# Patient Record
Sex: MALE | Race: WHITE | NOT HISPANIC OR LATINO | Employment: OTHER | URBAN - NONMETROPOLITAN AREA
[De-identification: names, ages, dates, MRNs, and addresses within clinical notes are randomized per-mention and may not be internally consistent; named-entity substitution may affect disease eponyms.]

---

## 2017-11-22 ENCOUNTER — OFFICE VISIT (OUTPATIENT)
Dept: URGENT CARE | Facility: CLINIC | Age: 82
End: 2017-11-22
Payer: COMMERCIAL

## 2017-11-22 PROCEDURE — 99213 OFFICE O/P EST LOW 20 MIN: CPT

## 2017-11-22 PROCEDURE — 12001 RPR S/N/AX/GEN/TRNK 2.5CM/<: CPT

## 2017-11-22 PROCEDURE — G0463 HOSPITAL OUTPT CLINIC VISIT: HCPCS

## 2017-11-23 NOTE — PROGRESS NOTES
Assessment    1  Laceration of left index finger without foreign body without damage to nail, initial encounter (883 0) ([id number])    Discussion/Summary  Discussion Summary:   Cleaned daily with soap and water, then apply a thin layer of antibiotic ointment  Keep bandaged to protect from being bumped  Sutures should be removed by your family doctor in 10-12 days  Monitor for any signs of infection, redness, drainage, etc    Understands and agrees with treatment plan: The treatment plan was reviewed with the patient/guardian  The patient/guardian understands and agrees with the treatment plan   Follow Up Instructions: Follow Up with your Primary Care Provider in 10-12 days  If your symptoms worsen, go to the nearest [place] [address] Emergency Department  Chief Complaint    1  Skin Wound  Chief Complaint Free Text Note Form: Laceration of L index finger today  Cut it on a razor knife  Patient states he is up to date on his Tetanus      History of Present Illness  HPI: Left index finger with a 3/4 inch flap like laceration adjacent to the nail  Hospital Based Practices Required Assessment:  Abuse And Domestic Violence Screen   Yes, the patient is safe at home  -- The patient states no one is hurting them  Depression And Suicide Screen  No, the patient has not had thoughts of hurting themself  No, the patient has not felt depressed in the past 7 days  Prefered Language is  [place]  Primary Language is  English  Active Problems  1  Essential hypertension (401 9) (I10)   2  Headache (784 0) (R51)   3  Hypercholesterolemia (272 0) (E78 00)   4  Neck pain (723 1) (M54 2)    Past Medical History  1  History of CAD (coronary artery disease) (414 00) (I25 10)   2  History of congestive heart failure (V12 59) (Z86 79)   3  History of esophageal reflux (V12 79) (Z87 19)   4  History of glaucoma (V12 49) (Z86 69)   5  History of gout (V12 29) (Z87 39)   6  History of hearing loss (V12 49) (Z86 69)   7   History of hyperlipidemia (V12 29) (Z86 39)   8  History of hypertension (V12 59) (Z86 79)   9  History of osteoarthritis (V13 4) (Z87 39)  Active Problems And Past Medical History Reviewed: The active problems and past medical history were reviewed and updated today  Family History  Mother    1  Family history of    2  Family history of hypertension (V17 49) (Z82 49)   3  Family history of myocardial infarction (V17 3) (Z82 49)  Father    4  Family history of    5  Family history of myocardial infarction (V17 3) (Z82 49)  Family History Reviewed: The family history was reviewed and updated today  Social History   · Denied: History of Drug use   · Former smoker (V15 82) (J15 594)   ·    · Nine children   · Retired   · Social drinker  Social History Reviewed: The social history was reviewed and updated today  Surgical History    1  History of Aortocoronary Bypass Graft One Coronary Artery   2  History of Burn Treatment   3  History of Cataract Surgery   4  History of Colonoscopy   5  History of PTCA   6  History of Total Knee Replacement Left  Surgical History Reviewed: The surgical history was reviewed and updated today  Current Meds   1  Albuterol 90 MCG/ACT AERS; Therapy: (Recorded:2016) to Recorded   2  Allopurinol 300 MG Oral Tablet; TAKE 1 TABLET DAILY; Therapy: (Recorded:2016) to Recorded   3  AmLODIPine Besylate 5 MG Oral Tablet; TAKE 1 TABLET DAILY; Therapy: (Recorded:2016) to Recorded   4  Aspirin 325 MG Oral Tablet; TAKE 1 TABLET EVERY MORNING; Therapy: (Recorded:[id number]) to Recorded   5  Azopt 1 % Ophthalmic Suspension; INSTILL 1 DROP INTO AFFECTED EYE(S) 3 TIMES DAILY; Therapy: (Recorded:2016) to Recorded   6  Benazepril HCl - 20 MG Oral Tablet; TAKE 1 TABLET DAILY; Therapy: (Recorded:2016) to Recorded   7  Furosemide 20 MG Oral Tablet; TAKE 1 TABLET Daily except mon/we/fri where pt takes 2 tables;  Therapy: (Recorded:2016) to Recorded   8  Latanoprost 0 005 % Ophthalmic Solution; INSTILL 1 DROP IN BOTH EYES AT BEDTIME; Therapy: (Recorded:30Mar2016) to Recorded   9  Omeprazole 20 MG Oral Capsule Delayed Release; take 1 capsule by mouth once daily; Therapy: (Recorded:30Mar2016) to Recorded   10  Potassium Chloride 10 MEQ TBCR; TAKE 1 TABLET DAILY; Therapy: (Recorded:30Mar2016) to Recorded   11  Pravastatin Sodium 40 MG Oral Tablet; TAKE 1 TABLET AT BEDTIME; Therapy: (Recorded:30Mar2016) to Recorded   12  Voltaren 1 % Transdermal Gel; Therapy: (Recorded:30Mar2016) to Recorded   13  Voltaren 75 MG TBEC; Take 1 tablet twice daily; Therapy: (Recorded:30Mar2016) to Recorded  Medication List Reviewed: The medication list was reviewed and updated today  Allergies    1  No Known Drug Allergies    Vitals  Signs   Recorded: 22Nov2017 10:59AM   Temperature: 97 6 F  Heart Rate: 68  Respiration: 20  Systolic: 689  Diastolic: 67  Height: 5 ft 7 in  Weight: 255 lb   BMI Calculated: 39 94  BSA Calculated: 2 24  O2 Saturation: 97    Physical Exam   Constitutional  General appearance: No acute distress, well appearing and well nourished  Skin  Examination of the skin for lesions: Abnormal  -- Left index finger with a 3/4 inch flap like laceration adjacent to the nail  Procedure   Procedure: wound repair  The wound was located on the and was 2 cm in length left 2 finger(s)  The wound was simple  The wound involved the subcutaneous tissue-- and-- had a skin flap  The wound was clean, but-- did not have a foreign body-- and-- did not have tissue loss  the neurovascular exam was normal  there was no tendon injury  The site was prepped with Betadine  Anesthesia: A digital block was performed  Lidocaine 4 ml, 1%, without epinephrine was injected  Closure: The cutaneous layer was closed with 2 sutures of 4-0 Prolene--   simple interrupted sutures were used for the skin closure    Dressing: a sterile dressing was placed-- and-- an antibiotic ointment was applied        Signatures   Electronically signed by : COLE Marcos ; Nov 22 2017  1:01PM EST                       (Author)

## 2020-03-25 ENCOUNTER — HOSPITAL ENCOUNTER (EMERGENCY)
Facility: HOSPITAL | Age: 85
Discharge: HOME | End: 2020-03-25
Attending: EMERGENCY MEDICINE | Admitting: EMERGENCY MEDICINE
Payer: COMMERCIAL

## 2020-03-25 ENCOUNTER — APPOINTMENT (EMERGENCY)
Dept: CT IMAGING | Facility: HOSPITAL | Age: 85
End: 2020-03-25
Payer: COMMERCIAL

## 2020-03-25 VITALS
HEIGHT: 69 IN | RESPIRATION RATE: 18 BRPM | DIASTOLIC BLOOD PRESSURE: 57 MMHG | HEART RATE: 54 BPM | WEIGHT: 250 LBS | BODY MASS INDEX: 37.03 KG/M2 | TEMPERATURE: 98.2 F | OXYGEN SATURATION: 97 % | SYSTOLIC BLOOD PRESSURE: 114 MMHG
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
## 2020-03-25 DIAGNOSIS — S30.1XXA CONTUSION OF FLANK, INITIAL ENCOUNTER: Primary | ICD-10-CM

## 2020-03-25 DIAGNOSIS — K57.90 DIVERTICULOSIS: ICD-10-CM

## 2020-03-25 DIAGNOSIS — R91.1 PULMONARY NODULE: ICD-10-CM

## 2020-03-25 LAB
ALBUMIN SERPL BCP-MCNC: 4.3 G/DL (ref 3.5–5.7)
ALP SERPL-CCNC: 43 U/L (ref 55–165)
ALT SERPL W P-5'-P-CCNC: 24 U/L (ref 7–52)
ANION GAP SERPL CALCULATED.3IONS-SCNC: 7 MMOL/L (ref 4–13)
APTT PPP: 33 SECONDS (ref 23–37)
AST SERPL W P-5'-P-CCNC: 22 U/L (ref 13–39)
BASOPHILS # BLD AUTO: 0.1 THOUSANDS/ΜL (ref 0–0.1)
BASOPHILS NFR BLD AUTO: 1 % (ref 0–2)
BILIRUB SERPL-MCNC: 0.8 MG/DL (ref 0.2–1)
BILIRUB UR QL STRIP: NEGATIVE
BUN SERPL-MCNC: 18 MG/DL (ref 7–25)
CALCIUM SERPL-MCNC: 9.4 MG/DL (ref 8.6–10.5)
CHLORIDE SERPL-SCNC: 102 MMOL/L (ref 98–107)
CLARITY UR: CLEAR
CO2 SERPL-SCNC: 30 MMOL/L (ref 21–31)
COLOR UR: YELLOW
CREAT SERPL-MCNC: 1.06 MG/DL (ref 0.7–1.3)
EOSINOPHIL # BLD AUTO: 0.4 THOUSAND/ΜL (ref 0–0.61)
EOSINOPHIL NFR BLD AUTO: 5 % (ref 0–5)
ERYTHROCYTE [DISTWIDTH] IN BLOOD BY AUTOMATED COUNT: 13.3 % (ref 11.5–14.5)
GFR SERPL CREATININE-BSD FRML MDRD: 64 ML/MIN/1.73SQ M
GLUCOSE SERPL-MCNC: 113 MG/DL (ref 65–99)
GLUCOSE UR STRIP-MCNC: NEGATIVE MG/DL
HCT VFR BLD AUTO: 49.9 % (ref 42–47)
HGB BLD-MCNC: 16.6 G/DL (ref 14–18)
HGB UR QL STRIP.AUTO: NEGATIVE
INR PPP: 0.99 (ref 0.9–1.5)
KETONES UR STRIP-MCNC: NEGATIVE MG/DL
LEUKOCYTE ESTERASE UR QL STRIP: NEGATIVE
LYMPHOCYTES # BLD AUTO: 2 THOUSANDS/ΜL (ref 0.6–4.47)
LYMPHOCYTES NFR BLD AUTO: 22 % (ref 21–51)
MCH RBC QN AUTO: 33.1 PG (ref 26–34)
MCHC RBC AUTO-ENTMCNC: 33.3 G/DL (ref 31–37)
MCV RBC AUTO: 100 FL (ref 81–99)
MONOCYTES # BLD AUTO: 0.8 THOUSAND/ΜL (ref 0.17–1.22)
MONOCYTES NFR BLD AUTO: 9 % (ref 2–12)
NEUTROPHILS # BLD AUTO: 5.6 THOUSANDS/ΜL (ref 1.4–6.5)
NEUTS SEG NFR BLD AUTO: 63 % (ref 42–75)
NITRITE UR QL STRIP: NEGATIVE
PH UR STRIP.AUTO: 7 [PH]
PLATELET # BLD AUTO: 252 THOUSANDS/UL (ref 149–390)
PMV BLD AUTO: 8.6 FL (ref 8.6–11.7)
POTASSIUM SERPL-SCNC: 4 MMOL/L (ref 3.5–5.5)
PROT SERPL-MCNC: 6.9 G/DL (ref 6.4–8.9)
PROT UR STRIP-MCNC: NEGATIVE MG/DL
PROTHROMBIN TIME: 11.5 SECONDS (ref 10.2–13)
RBC # BLD AUTO: 5.01 MILLION/UL (ref 4.3–5.9)
SODIUM SERPL-SCNC: 139 MMOL/L (ref 134–143)
SP GR UR STRIP.AUTO: 1.01 (ref 1–1.03)
UROBILINOGEN UR QL STRIP.AUTO: 0.2 E.U./DL
WBC # BLD AUTO: 9 THOUSAND/UL (ref 4.8–10.8)

## 2020-03-25 PROCEDURE — 99284 EMERGENCY DEPT VISIT MOD MDM: CPT

## 2020-03-25 PROCEDURE — 80053 COMPREHEN METABOLIC PANEL: CPT | Performed by: EMERGENCY MEDICINE

## 2020-03-25 PROCEDURE — 85025 COMPLETE CBC W/AUTO DIFF WBC: CPT | Performed by: EMERGENCY MEDICINE

## 2020-03-25 PROCEDURE — 36415 COLL VENOUS BLD VENIPUNCTURE: CPT | Performed by: EMERGENCY MEDICINE

## 2020-03-25 PROCEDURE — 85730 THROMBOPLASTIN TIME PARTIAL: CPT | Performed by: EMERGENCY MEDICINE

## 2020-03-25 PROCEDURE — 85610 PROTHROMBIN TIME: CPT | Performed by: EMERGENCY MEDICINE

## 2020-03-25 PROCEDURE — 81003 URINALYSIS AUTO W/O SCOPE: CPT | Performed by: EMERGENCY MEDICINE

## 2020-03-25 PROCEDURE — 96361 HYDRATE IV INFUSION ADD-ON: CPT

## 2020-03-25 PROCEDURE — 74177 CT ABD & PELVIS W/CONTRAST: CPT

## 2020-03-25 PROCEDURE — 99284 EMERGENCY DEPT VISIT MOD MDM: CPT | Performed by: EMERGENCY MEDICINE

## 2020-03-25 PROCEDURE — 96360 HYDRATION IV INFUSION INIT: CPT

## 2020-03-25 PROCEDURE — 71260 CT THORAX DX C+: CPT

## 2020-03-25 RX ORDER — ALLOPURINOL 300 MG/1
300 TABLET ORAL DAILY
COMMUNITY

## 2020-03-25 RX ORDER — BENAZEPRIL HYDROCHLORIDE 10 MG/1
10 TABLET ORAL
COMMUNITY

## 2020-03-25 RX ORDER — LATANOPROST 50 UG/ML
1 SOLUTION/ DROPS OPHTHALMIC
COMMUNITY

## 2020-03-25 RX ORDER — ROSUVASTATIN CALCIUM 20 MG/1
20 TABLET, COATED ORAL DAILY
COMMUNITY

## 2020-03-25 RX ORDER — FUROSEMIDE 20 MG/1
20 TABLET ORAL DAILY
COMMUNITY
End: 2021-04-26 | Stop reason: SDUPTHER

## 2020-03-25 RX ORDER — ISOSORBIDE MONONITRATE 60 MG/1
90 TABLET, EXTENDED RELEASE ORAL DAILY
COMMUNITY

## 2020-03-25 RX ORDER — DIPHENOXYLATE HYDROCHLORIDE AND ATROPINE SULFATE 2.5; .025 MG/1; MG/1
1 TABLET ORAL DAILY
COMMUNITY

## 2020-03-25 RX ORDER — ASPIRIN 325 MG
1 TABLET ORAL DAILY
COMMUNITY
End: 2021-04-26 | Stop reason: DRUGHIGH

## 2020-03-25 RX ADMIN — IOHEXOL 100 ML: 350 INJECTION, SOLUTION INTRAVENOUS at 11:46

## 2020-03-25 RX ADMIN — SODIUM CHLORIDE 500 ML: 0.9 INJECTION, SOLUTION INTRAVENOUS at 10:56

## 2020-03-25 NOTE — ED PROVIDER NOTES
History  Chief Complaint   Patient presents with    Fall     fell on sat, left sided rib pain     Patient is an 80-year-old male  This past Saturday he was up on a ladder, maybe about 6 ft  He was trying to hammer nail into a rafter when he fell  His left flank struck work pants  He did not strike his head  He suffered a significant bruise  He is worried he might have broken a rib  No hemoptysis or trouble breathing  No hematuria  No abdominal pain  He denies any neck pain  No midline back pain  Denies any extremity trauma the injury was sudden  His symptoms have been fairly constant  He does take aspirin  No other anticoagulants  Prior to Admission Medications   Prescriptions Last Dose Informant Patient Reported? Taking?   allopurinol (ZYLOPRIM) 300 mg tablet   Yes No   Sig: Take 300 mg by mouth daily   aspirin 325 mg tablet   Yes No   Sig: Take 1 tablet by mouth daily   benazepril (LOTENSIN) 10 mg tablet   Yes No   Sig: Take 10 mg by mouth   furosemide (LASIX) 20 mg tablet   Yes No   Sig: Take 20 mg by mouth daily   isosorbide mononitrate (IMDUR) 60 mg 24 hr tablet   Yes No   Sig: Take 90 mg by mouth daily   latanoprost (XALATAN) 0 005 % ophthalmic solution   Yes No   Sig: Apply 1 drop to eye   multivitamin (THERAGRAN) TABS   Yes No   Sig: Take 1 tablet by mouth daily   rosuvastatin (CRESTOR) 20 MG tablet   Yes No   Sig: Take 20 mg by mouth daily      Facility-Administered Medications: None       Past Medical History:   Diagnosis Date    Heart disease     Hypertension        History reviewed  No pertinent surgical history  History reviewed  No pertinent family history  I have reviewed and agree with the history as documented      E-Cigarette/Vaping    E-Cigarette Use Never User      E-Cigarette/Vaping Substances     Social History     Tobacco Use    Smoking status: Never Smoker    Smokeless tobacco: Never Used   Substance Use Topics    Alcohol use: Never     Frequency: Never    Drug use: Never       Review of Systems   Constitutional: Negative for chills and fever  HENT: Negative for rhinorrhea and sore throat  Eyes: Negative for pain, redness and visual disturbance  Respiratory: Negative for cough and shortness of breath  Cardiovascular: Negative for chest pain and leg swelling  Gastrointestinal: Negative for abdominal pain, diarrhea and vomiting  Endocrine: Negative for polydipsia and polyuria  Genitourinary: Positive for flank pain  Negative for dysuria, frequency and hematuria  Musculoskeletal: Negative for back pain and neck pain  Skin: Negative for rash and wound  Allergic/Immunologic: Negative for immunocompromised state  Neurological: Negative for weakness, numbness and headaches  Psychiatric/Behavioral: Negative for hallucinations and suicidal ideas  All other systems reviewed and are negative  Physical Exam  Physical Exam   Constitutional: He is oriented to person, place, and time  He appears well-developed and well-nourished  No distress  HENT:   Head: Normocephalic and atraumatic  There is no palpable scalp step off or swelling  No lacerations  There is no facial bone tenderness  No swelling or step-offs  No crepitus  There is no LeFort fracture  No otorrhea  No rhinorrhea  No nasal deformity or epistaxis  There is no raccoon's or Lou's sign  Eyes: Pupils are equal, round, and reactive to light  EOM are normal    Globes are intact  No hyphema  Orbits are atraumatic  Neck:   There is no midline C-spine tenderness  Trachea is midline  There is no step-offs or swelling  No crepitus  No JVD  Cardiovascular: Normal rate, regular rhythm, normal heart sounds and intact distal pulses  Exam reveals no gallop and no friction rub  No murmur heard  Pulmonary/Chest: Effort normal and breath sounds normal  No stridor  No respiratory distress  He exhibits no tenderness  There is no bruising  No crepitus  Abdominal: Soft   Bowel sounds are normal  He exhibits no distension  There is no tenderness  There is no rebound and no guarding  Musculoskeletal: Normal range of motion  He exhibits no tenderness or deformity  No thoracic or lumbar spine tenderness  Pelvis is stable  No palpable step-offs or swelling  No crepitus  There is a large bruise to the left flank  There is left CVA tenderness  There is tenderness to the left posterior lower ribs without crepitus  Extremities are all nontender  Neurological: He is alert and oriented to person, place, and time  He has normal strength  No sensory deficit  GCS eye subscore is 4  GCS verbal subscore is 5  GCS motor subscore is 6  Normal gait  Skin: Skin is warm and dry  He is not diaphoretic  No pallor  Psychiatric: He has a normal mood and affect  His behavior is normal    Vitals reviewed        Vital Signs  ED Triage Vitals [03/25/20 1029]   Temperature Pulse Respirations Blood Pressure SpO2   97 7 °F (36 5 °C) 58 20 146/87 97 %      Temp Source Heart Rate Source Patient Position - Orthostatic VS BP Location FiO2 (%)   Temporal Monitor Sitting Left arm --      Pain Score       9           Vitals:    03/25/20 1029 03/25/20 1234   BP: 146/87 114/57   Pulse: 58 (!) 54   Patient Position - Orthostatic VS: Sitting Lying         Visual Acuity      ED Medications  Medications   sodium chloride 0 9 % bolus 500 mL (0 mL Intravenous Stopped 3/25/20 1232)   iohexol (OMNIPAQUE) 350 MG/ML injection (MULTI-DOSE) 100 mL (100 mL Intravenous Given 3/25/20 1146)       Diagnostic Studies  Results Reviewed     Procedure Component Value Units Date/Time    Protime-INR [[id number]]  (Normal) Collected:  03/25/20 1053    Lab Status:  Final result Specimen:  Blood from Arm, Right Updated:  03/25/20 1128     Protime 11 5 seconds      INR 0 99    APTT [[id number]]  (Normal) Collected:  03/25/20 1053    Lab Status:  Final result Specimen:  Blood from Arm, Right Updated:  03/25/20 1128     PTT 33 seconds     Comprehensive metabolic panel [[id number]]  (Abnormal) Collected:  03/25/20 1054    Lab Status:  Final result Specimen:  Blood from Arm, Right Updated:  03/25/20 1128     Sodium 139 mmol/L      Potassium 4 0 mmol/L      Chloride 102 mmol/L      CO2 30 mmol/L      ANION GAP 7 mmol/L      BUN 18 mg/dL      Creatinine 1 06 mg/dL      Glucose 113 mg/dL      Calcium 9 4 mg/dL      AST 22 U/L      ALT 24 U/L      Alkaline Phosphatase 43 U/L      Total Protein 6 9 g/dL      Albumin 4 3 g/dL      Total Bilirubin 0 80 mg/dL      eGFR 64 ml/min/1 73sq m     Narrative:       [place] guidelines for Chronic Kidney Disease (CKD):     Stage 1 with normal or high GFR (GFR > 90 mL/min/1 73 square meters)    Stage 2 Mild CKD (GFR = 60-89 mL/min/1 73 square meters)    Stage 3A Moderate CKD (GFR = 45-59 mL/min/1 73 square meters)    Stage 3B Moderate CKD (GFR = 30-44 mL/min/1 73 square meters)    Stage 4 Severe CKD (GFR = 15-29 mL/min/1 73 square meters)    Stage 5 End Stage CKD (GFR <15 mL/min/1 73 square meters)  Note: GFR calculation is accurate only with a steady state creatinine    CBC and differential [[id number]]  (Abnormal) Collected:  03/25/20 1053    Lab Status:  Final result Specimen:  Blood from Arm, Right Updated:  03/25/20 1112     WBC 9 00 Thousand/uL      RBC 5 01 Million/uL      Hemoglobin 16 6 g/dL      Hematocrit 49 9 %       fL      MCH 33 1 pg      MCHC 33 3 g/dL      RDW 13 3 %      MPV 8 6 fL      Platelets 033 Thousands/uL      Neutrophils Relative 63 %      Lymphocytes Relative 22 %      Monocytes Relative 9 %      Eosinophils Relative 5 %      Basophils Relative 1 %      Neutrophils Absolute 5 60 Thousands/µL      Lymphocytes Absolute 2 00 Thousands/µL      Monocytes Absolute 0 80 Thousand/µL      Eosinophils Absolute 0 40 Thousand/µL      Basophils Absolute 0 10 Thousands/µL     UA (URINE) with reflex to Scope [[id number]]  (Normal) Collected:  03/25/20 1049    Lab Status:  Final result Specimen:  Urine, Clean Catch Updated:  03/25/20 1102     Color, UA Yellow     Clarity, UA Clear     Specific Gravity, UA 1 010     pH, UA 7 0     Leukocytes, UA Negative     Nitrite, UA Negative     Protein, UA Negative mg/dl      Glucose, UA Negative mg/dl      Ketones, UA Negative mg/dl      Urobilinogen, UA 0 2 E U /dl      Bilirubin, UA Negative     Blood, UA Negative                 CT chest abdomen pelvis w contrast   Final Result by Payton Mohamud MD (03/25 1216)      CT chest:      No acute thoracic process  No acute fracture  3 mm noncalcified nodule left lung base with unknown long-term stability  Based on current Fleischner Society 2017 Guidelines on incidental pulmonary nodule, no routine follow-up is needed if the patient is considered low risk for lung cancer  If the    patient is considered high risk for lung cancer, 12 month follow-up non-contrast chest CT is recommended  CT abdomen and pelvis:      No acute abdominopelvic process  No acute fracture  Colonic diverticulosis  Workstation performed: [id number]                    Procedures  Procedures         ED Course                                 MDM  Number of Diagnoses or Management Options  Diagnosis management comments: Imaging negative for acute traumatic injuries  An incidental pulmonary nodule was found  Patient is a former smoker  He will need a repeat CT scan in 1 year to assess stability  Patient was made aware of this  Recommended follow up with his family doctor for this  Incidental diverticula were also seen  Laboratory evaluation was nonspecific  Appropriate for discharge and outpatient management         Amount and/or Complexity of Data Reviewed  Clinical lab tests: ordered and reviewed  Tests in the radiology section of CPT®: ordered and reviewed          Disposition  Final diagnoses:   Contusion of flank, initial encounter - Left   Fall, initial encounter   Pulmonary nodule Diverticulosis     Time reflects when diagnosis was documented in both MDM as applicable and the Disposition within this note     Time User Action Codes Description Comment    3/25/2020 12:51 PM Huel Sadler Add [S30 1XXA] Contusion of flank, initial encounter     3/25/2020 12:52 PM Huel Sadler Modify [S30 1XXA] Contusion of flank, initial encounter Left    3/25/2020 12:52 PM Huel Sadler Add [U03  VRSL] Fall, initial encounter     3/25/2020 12:52 PM Huel Sadler Add [R91 1] Pulmonary nodule     3/25/2020 12:52 PM Huel Sadler Add [B48 97] Diverticulosis       ED Disposition     ED Disposition Condition Date/Time Comment    Discharge Stable Wed Mar 25, 2020 12:50 PM Lennox Colon discharge to home/self care  Follow-up Information     Follow up With Specialties Details [address], DO Internal Medicine In 1 week As needed Irving Echols  [phone]            Patient's Medications   Discharge Prescriptions    No medications on file     No discharge procedures on file      PDMP Review     None          ED Provider  Electronically Signed by           Janell Mckee MD  03/25/20 9282

## 2021-03-26 ENCOUNTER — APPOINTMENT (EMERGENCY)
Dept: RADIOLOGY | Facility: HOSPITAL | Age: 86
End: 2021-03-26
Payer: COMMERCIAL

## 2021-03-26 ENCOUNTER — APPOINTMENT (INPATIENT)
Dept: RADIOLOGY | Facility: HOSPITAL | Age: 86
DRG: 242 | End: 2021-03-26
Payer: COMMERCIAL

## 2021-03-26 ENCOUNTER — HOSPITAL ENCOUNTER (EMERGENCY)
Facility: HOSPITAL | Age: 86
End: 2021-03-26
Attending: FAMILY MEDICINE
Payer: COMMERCIAL

## 2021-03-26 ENCOUNTER — APPOINTMENT (INPATIENT)
Dept: NON INVASIVE DIAGNOSTICS | Facility: HOSPITAL | Age: 86
DRG: 242 | End: 2021-03-26
Payer: COMMERCIAL

## 2021-03-26 ENCOUNTER — HOSPITAL ENCOUNTER (INPATIENT)
Facility: HOSPITAL | Age: 86
LOS: 4 days | Discharge: HOME | DRG: 242 | End: 2021-03-30
Attending: ANESTHESIOLOGY | Admitting: ANESTHESIOLOGY
Payer: COMMERCIAL

## 2021-03-26 VITALS
DIASTOLIC BLOOD PRESSURE: 62 MMHG | RESPIRATION RATE: 18 BRPM | SYSTOLIC BLOOD PRESSURE: 118 MMHG | WEIGHT: 279.98 LBS | TEMPERATURE: 98.6 F | BODY MASS INDEX: 41.35 KG/M2 | HEART RATE: 36 BPM | OXYGEN SATURATION: 97 %

## 2021-03-26 DIAGNOSIS — R00.1 SYMPTOMATIC BRADYCARDIA: ICD-10-CM

## 2021-03-26 DIAGNOSIS — N17.9 ACUTE RENAL FAILURE (ARF) (HCC): ICD-10-CM

## 2021-03-26 DIAGNOSIS — I44.2 COMPLETE HEART BLOCK (HCC): Primary | ICD-10-CM

## 2021-03-26 DIAGNOSIS — R77.8 ELEVATED TROPONIN: ICD-10-CM

## 2021-03-26 PROBLEM — R73.9 HYPERGLYCEMIA: Status: ACTIVE | Noted: 2021-03-26

## 2021-03-26 PROBLEM — R68.89 EXERCISE INTOLERANCE: Status: ACTIVE | Noted: 2021-03-26

## 2021-03-26 PROBLEM — E78.5 HYPERLIPIDEMIA: Status: ACTIVE | Noted: 2021-03-26

## 2021-03-26 PROBLEM — I10 HYPERTENSION: Status: ACTIVE | Noted: 2021-03-26

## 2021-03-26 PROBLEM — D72.829 LEUKOCYTOSIS: Status: ACTIVE | Noted: 2021-03-26

## 2021-03-26 PROBLEM — M10.9 GOUT: Status: ACTIVE | Noted: 2021-03-26

## 2021-03-26 LAB
ANION GAP SERPL CALCULATED.3IONS-SCNC: 10 MMOL/L (ref 4–13)
ANION GAP SERPL CALCULATED.3IONS-SCNC: 8 MMOL/L (ref 4–13)
ATRIAL RATE: 27 BPM
BASOPHILS # BLD AUTO: 0.04 THOUSANDS/ΜL (ref 0–0.1)
BASOPHILS # BLD AUTO: 0.1 THOUSANDS/ΜL (ref 0–0.1)
BASOPHILS NFR BLD AUTO: 0 % (ref 0–1)
BASOPHILS NFR BLD AUTO: 1 % (ref 0–2)
BNP SERPL-MCNC: 686 PG/ML (ref 1–100)
BUN SERPL-MCNC: 45 MG/DL (ref 7–25)
BUN SERPL-MCNC: 47 MG/DL (ref 5–25)
CA-I BLD-SCNC: 1.15 MMOL/L (ref 1.12–1.32)
CALCIUM SERPL-MCNC: 9 MG/DL (ref 8.3–10.1)
CALCIUM SERPL-MCNC: 9.2 MG/DL (ref 8.6–10.5)
CHLORIDE SERPL-SCNC: 102 MMOL/L (ref 98–107)
CHLORIDE SERPL-SCNC: 107 MMOL/L (ref 100–108)
CO2 SERPL-SCNC: 23 MMOL/L (ref 21–32)
CO2 SERPL-SCNC: 24 MMOL/L (ref 21–31)
CREAT SERPL-MCNC: 1.99 MG/DL (ref 0.7–1.3)
CREAT SERPL-MCNC: 2.53 MG/DL (ref 0.6–1.3)
EOSINOPHIL # BLD AUTO: 0.01 THOUSAND/ΜL (ref 0–0.61)
EOSINOPHIL # BLD AUTO: 0.1 THOUSAND/ΜL (ref 0–0.61)
EOSINOPHIL NFR BLD AUTO: 0 % (ref 0–6)
EOSINOPHIL NFR BLD AUTO: 1 % (ref 0–5)
ERYTHROCYTE [DISTWIDTH] IN BLOOD BY AUTOMATED COUNT: 13.6 % (ref 11.6–15.1)
ERYTHROCYTE [DISTWIDTH] IN BLOOD BY AUTOMATED COUNT: 14.2 % (ref 11.5–14.5)
FLUAV RNA RESP QL NAA+PROBE: NEGATIVE
FLUBV RNA RESP QL NAA+PROBE: NEGATIVE
GFR SERPL CREATININE-BSD FRML MDRD: 22 ML/MIN/1.73SQ M
GFR SERPL CREATININE-BSD FRML MDRD: 30 ML/MIN/1.73SQ M
GLUCOSE SERPL-MCNC: 116 MG/DL (ref 65–140)
GLUCOSE SERPL-MCNC: 119 MG/DL (ref 65–99)
GLUCOSE SERPL-MCNC: 152 MG/DL (ref 65–140)
HCT VFR BLD AUTO: 42.7 % (ref 36.5–49.3)
HCT VFR BLD AUTO: 43.6 % (ref 42–47)
HGB BLD-MCNC: 13.8 G/DL (ref 12–17)
HGB BLD-MCNC: 14.2 G/DL (ref 14–18)
IMM GRANULOCYTES # BLD AUTO: 0.07 THOUSAND/UL (ref 0–0.2)
IMM GRANULOCYTES NFR BLD AUTO: 1 % (ref 0–2)
INR PPP: 1.22 (ref 0.84–1.19)
LYMPHOCYTES # BLD AUTO: 0.92 THOUSANDS/ΜL (ref 0.6–4.47)
LYMPHOCYTES # BLD AUTO: 2.3 THOUSANDS/ΜL (ref 0.6–4.47)
LYMPHOCYTES NFR BLD AUTO: 21 % (ref 21–51)
LYMPHOCYTES NFR BLD AUTO: 7 % (ref 14–44)
MAGNESIUM SERPL-MCNC: 2.2 MG/DL (ref 1.9–2.7)
MAGNESIUM SERPL-MCNC: 2.3 MG/DL (ref 1.6–2.6)
MCH RBC QN AUTO: 32.5 PG (ref 26.8–34.3)
MCH RBC QN AUTO: 32.6 PG (ref 26–34)
MCHC RBC AUTO-ENTMCNC: 32.3 G/DL (ref 31.4–37.4)
MCHC RBC AUTO-ENTMCNC: 32.6 G/DL (ref 31–37)
MCV RBC AUTO: 100 FL (ref 81–99)
MCV RBC AUTO: 101 FL (ref 82–98)
MONOCYTES # BLD AUTO: 1 THOUSAND/ΜL (ref 0.17–1.22)
MONOCYTES # BLD AUTO: 1.5 THOUSAND/ΜL (ref 0.17–1.22)
MONOCYTES NFR BLD AUTO: 13 % (ref 2–12)
MONOCYTES NFR BLD AUTO: 8 % (ref 4–12)
NEUTROPHILS # BLD AUTO: 10.9 THOUSANDS/ΜL (ref 1.85–7.62)
NEUTROPHILS # BLD AUTO: 7.1 THOUSANDS/ΜL (ref 1.4–6.5)
NEUTS SEG NFR BLD AUTO: 64 % (ref 42–75)
NEUTS SEG NFR BLD AUTO: 84 % (ref 43–75)
NRBC BLD AUTO-RTO: 0 /100 WBCS
P AXIS: 89 DEGREES
PHOSPHATE SERPL-MCNC: 3.6 MG/DL (ref 3–5.5)
PHOSPHATE SERPL-MCNC: 3.8 MG/DL (ref 2.3–4.1)
PLATELET # BLD AUTO: 212 THOUSANDS/UL (ref 149–390)
PLATELET # BLD AUTO: 212 THOUSANDS/UL (ref 149–390)
PMV BLD AUTO: 10 FL (ref 8.6–11.7)
PMV BLD AUTO: 11.8 FL (ref 8.9–12.7)
POTASSIUM SERPL-SCNC: 5 MMOL/L (ref 3.5–5.5)
POTASSIUM SERPL-SCNC: 5.4 MMOL/L (ref 3.5–5.3)
PR INTERVAL: 330 MS
PROTHROMBIN TIME: 15.3 SECONDS (ref 11.6–14.5)
QRS AXIS: 269 DEGREES
QRSD INTERVAL: 130 MS
QT INTERVAL: 682 MS
QTC INTERVAL: 456 MS
RBC # BLD AUTO: 4.24 MILLION/UL (ref 3.88–5.62)
RBC # BLD AUTO: 4.36 MILLION/UL (ref 4.3–5.9)
RSV RNA RESP QL NAA+PROBE: NEGATIVE
SARS-COV-2 RNA RESP QL NAA+PROBE: NEGATIVE
SODIUM SERPL-SCNC: 136 MMOL/L (ref 134–143)
SODIUM SERPL-SCNC: 138 MMOL/L (ref 136–145)
T WAVE AXIS: 147 DEGREES
TROPONIN I SERPL-MCNC: 0.04 NG/ML
VENTRICULAR RATE: 27 BPM
WBC # BLD AUTO: 11.1 THOUSAND/UL (ref 4.8–10.8)
WBC # BLD AUTO: 12.94 THOUSAND/UL (ref 4.31–10.16)

## 2021-03-26 PROCEDURE — 83880 ASSAY OF NATRIURETIC PEPTIDE: CPT | Performed by: FAMILY MEDICINE

## 2021-03-26 PROCEDURE — 71045 X-RAY EXAM CHEST 1 VIEW: CPT

## 2021-03-26 PROCEDURE — 83735 ASSAY OF MAGNESIUM: CPT | Performed by: PHYSICIAN ASSISTANT

## 2021-03-26 PROCEDURE — 33210 INSERT ELECTRD/PM CATH SNGL: CPT

## 2021-03-26 PROCEDURE — 80048 BASIC METABOLIC PNL TOTAL CA: CPT | Performed by: FAMILY MEDICINE

## 2021-03-26 PROCEDURE — 93010 ELECTROCARDIOGRAM REPORT: CPT | Performed by: INTERNAL MEDICINE

## 2021-03-26 PROCEDURE — 96374 THER/PROPH/DIAG INJ IV PUSH: CPT

## 2021-03-26 PROCEDURE — 82330 ASSAY OF CALCIUM: CPT | Performed by: PHYSICIAN ASSISTANT

## 2021-03-26 PROCEDURE — 96365 THER/PROPH/DIAG IV INF INIT: CPT

## 2021-03-26 PROCEDURE — 85610 PROTHROMBIN TIME: CPT | Performed by: FAMILY MEDICINE

## 2021-03-26 PROCEDURE — 99285 EMERGENCY DEPT VISIT HI MDM: CPT

## 2021-03-26 PROCEDURE — 85025 COMPLETE CBC W/AUTO DIFF WBC: CPT | Performed by: PHYSICIAN ASSISTANT

## 2021-03-26 PROCEDURE — 93005 ELECTROCARDIOGRAM TRACING: CPT

## 2021-03-26 PROCEDURE — 99291 CRITICAL CARE FIRST HOUR: CPT | Performed by: FAMILY MEDICINE

## 2021-03-26 PROCEDURE — 0241U HB NFCT DS VIR RESP RNA 4 TRGT: CPT | Performed by: FAMILY MEDICINE

## 2021-03-26 PROCEDURE — 5A12012 PERFORMANCE OF CARDIAC OUTPUT, SINGLE, MANUAL: ICD-10-PCS | Performed by: INTERNAL MEDICINE

## 2021-03-26 PROCEDURE — 5A1223Z PERFORMANCE OF CARDIAC PACING, CONTINUOUS: ICD-10-PCS | Performed by: INTERNAL MEDICINE

## 2021-03-26 PROCEDURE — 33210 INSERT ELECTRD/PM CATH SNGL: CPT | Performed by: INTERNAL MEDICINE

## 2021-03-26 PROCEDURE — C1898 LEAD, PMKR, OTHER THAN TRANS: HCPCS

## 2021-03-26 PROCEDURE — 99222 1ST HOSP IP/OBS MODERATE 55: CPT | Performed by: INTERNAL MEDICINE

## 2021-03-26 PROCEDURE — 5A2204Z RESTORATION OF CARDIAC RHYTHM, SINGLE: ICD-10-PCS | Performed by: INTERNAL MEDICINE

## 2021-03-26 PROCEDURE — C1892 INTRO/SHEATH,FIXED,PEEL-AWAY: HCPCS

## 2021-03-26 PROCEDURE — 83735 ASSAY OF MAGNESIUM: CPT | Performed by: FAMILY MEDICINE

## 2021-03-26 PROCEDURE — 80048 BASIC METABOLIC PNL TOTAL CA: CPT | Performed by: PHYSICIAN ASSISTANT

## 2021-03-26 PROCEDURE — 80048 BASIC METABOLIC PNL TOTAL CA: CPT | Performed by: NURSE PRACTITIONER

## 2021-03-26 PROCEDURE — 36415 COLL VENOUS BLD VENIPUNCTURE: CPT | Performed by: FAMILY MEDICINE

## 2021-03-26 PROCEDURE — 82948 REAGENT STRIP/BLOOD GLUCOSE: CPT

## 2021-03-26 PROCEDURE — 02HK3JZ INSERTION OF PACEMAKER LEAD INTO RIGHT VENTRICLE, PERCUTANEOUS APPROACH: ICD-10-PCS | Performed by: INTERNAL MEDICINE

## 2021-03-26 PROCEDURE — 84484 ASSAY OF TROPONIN QUANT: CPT | Performed by: FAMILY MEDICINE

## 2021-03-26 PROCEDURE — C1894 INTRO/SHEATH, NON-LASER: HCPCS

## 2021-03-26 PROCEDURE — 85025 COMPLETE CBC W/AUTO DIFF WBC: CPT | Performed by: FAMILY MEDICINE

## 2021-03-26 PROCEDURE — 84100 ASSAY OF PHOSPHORUS: CPT | Performed by: PHYSICIAN ASSISTANT

## 2021-03-26 PROCEDURE — 84100 ASSAY OF PHOSPHORUS: CPT | Performed by: FAMILY MEDICINE

## 2021-03-26 PROCEDURE — 99291 CRITICAL CARE FIRST HOUR: CPT | Performed by: EMERGENCY MEDICINE

## 2021-03-26 RX ORDER — DOPAMINE HYDROCHLORIDE 160 MG/100ML
1-20 INJECTION, SOLUTION INTRAVENOUS CONTINUOUS
Status: DISCONTINUED | OUTPATIENT
Start: 2021-03-26 | End: 2021-03-26 | Stop reason: HOSPADM

## 2021-03-26 RX ORDER — ATROPINE SULFATE 1 MG/ML
1 INJECTION, SOLUTION INTRAMUSCULAR; INTRAVENOUS; SUBCUTANEOUS ONCE
Status: COMPLETED | OUTPATIENT
Start: 2021-03-26 | End: 2021-03-26

## 2021-03-26 RX ORDER — HEPARIN SODIUM 5000 [USP'U]/ML
5000 INJECTION, SOLUTION INTRAVENOUS; SUBCUTANEOUS EVERY 8 HOURS SCHEDULED
Status: DISCONTINUED | OUTPATIENT
Start: 2021-03-26 | End: 2021-03-30 | Stop reason: HOSPADM

## 2021-03-26 RX ORDER — ASPIRIN 81 MG/1
81 TABLET ORAL DAILY
Status: DISCONTINUED | OUTPATIENT
Start: 2021-03-27 | End: 2021-03-30 | Stop reason: HOSPADM

## 2021-03-26 RX ORDER — LIDOCAINE HYDROCHLORIDE 10 MG/ML
INJECTION, SOLUTION EPIDURAL; INFILTRATION; INTRACAUDAL; PERINEURAL CODE/TRAUMA/SEDATION MEDICATION
Status: COMPLETED | OUTPATIENT
Start: 2021-03-26 | End: 2021-03-26

## 2021-03-26 RX ORDER — SODIUM CHLORIDE, SODIUM GLUCONATE, SODIUM ACETATE, POTASSIUM CHLORIDE, MAGNESIUM CHLORIDE, SODIUM PHOSPHATE, DIBASIC, AND POTASSIUM PHOSPHATE .53; .5; .37; .037; .03; .012; .00082 G/100ML; G/100ML; G/100ML; G/100ML; G/100ML; G/100ML; G/100ML
500 INJECTION, SOLUTION INTRAVENOUS ONCE
Status: COMPLETED | OUTPATIENT
Start: 2021-03-26 | End: 2021-03-27

## 2021-03-26 RX ORDER — AMOXICILLIN 250 MG
1 CAPSULE ORAL 2 TIMES DAILY
Status: DISCONTINUED | OUTPATIENT
Start: 2021-03-26 | End: 2021-03-30 | Stop reason: HOSPADM

## 2021-03-26 RX ORDER — LANOLIN ALCOHOL/MO/W.PET/CERES
6 CREAM (GRAM) TOPICAL
Status: DISCONTINUED | OUTPATIENT
Start: 2021-03-26 | End: 2021-03-30 | Stop reason: HOSPADM

## 2021-03-26 RX ORDER — ATORVASTATIN CALCIUM 40 MG/1
40 TABLET, FILM COATED ORAL
Status: DISCONTINUED | OUTPATIENT
Start: 2021-03-27 | End: 2021-03-30 | Stop reason: HOSPADM

## 2021-03-26 RX ADMIN — DOCUSATE SODIUM AND SENNOSIDES 1 TABLET: 8.6; 5 TABLET ORAL at 22:40

## 2021-03-26 RX ADMIN — HEPARIN SODIUM 5000 UNITS: 5000 INJECTION INTRAVENOUS; SUBCUTANEOUS at 18:56

## 2021-03-26 RX ADMIN — SODIUM CHLORIDE, SODIUM GLUCONATE, SODIUM ACETATE, POTASSIUM CHLORIDE, MAGNESIUM CHLORIDE, SODIUM PHOSPHATE, DIBASIC, AND POTASSIUM PHOSPHATE 500 ML: .53; .5; .37; .037; .03; .012; .00082 INJECTION, SOLUTION INTRAVENOUS at 23:00

## 2021-03-26 RX ADMIN — ATROPINE SULFATE 1 MG: 1 INJECTION, SOLUTION INTRAMUSCULAR; INTRAVENOUS; SUBCUTANEOUS at 14:03

## 2021-03-26 RX ADMIN — DOPAMINE HYDROCHLORIDE 5 MCG/KG/MIN: 160 INJECTION, SOLUTION INTRAVENOUS at 14:15

## 2021-03-26 RX ADMIN — HEPARIN SODIUM 5000 UNITS: 5000 INJECTION INTRAVENOUS; SUBCUTANEOUS at 22:41

## 2021-03-26 RX ADMIN — MELATONIN TAB 3 MG 6 MG: 3 TAB at 22:40

## 2021-03-26 RX ADMIN — LIDOCAINE HYDROCHLORIDE 5 ML: 10 INJECTION, SOLUTION EPIDURAL; INFILTRATION; INTRACAUDAL; PERINEURAL at 16:58

## 2021-03-26 NOTE — ED PROVIDER NOTES
History  Chief Complaint   Patient presents with    Shortness of Breath     started a week ago  History provided by:  Patient   used: No    This 49-year-old male with the history of CHF coronary artery disease presented to ED with the complain of shortness of breath that started 3 weeks ago  Patient came in with the complete heart block with a heart rate of 20  Patient was immediately placed on external pacer and was given atropine  Patient is denying any chest pain does complain of shortness of breath  Wife states that he has been short of breath since Friday has been doing chores around the house and complaining of shortness of breath and feeling dizzy  She states she called his PCP who recommended to bring him to his office however his shortness breath was worse which prompted this ED visit  Patient denies any abdominal pain nausea vomiting or diarrhea at this time  Patient was placed on 2 L nasal cannula and was started on dopamine drip per recommendation from critical care  Prior to Admission Medications   Prescriptions Last Dose Informant Patient Reported? Taking?   allopurinol (ZYLOPRIM) 300 mg tablet   Yes No   Sig: Take 300 mg by mouth daily   aspirin 325 mg tablet   Yes No   Sig: Take 1 tablet by mouth daily   benazepril (LOTENSIN) 10 mg tablet   Yes No   Sig: Take 10 mg by mouth   furosemide (LASIX) 20 mg tablet   Yes No   Sig: Take 20 mg by mouth daily   isosorbide mononitrate (IMDUR) 60 mg 24 hr tablet   Yes No   Sig: Take 90 mg by mouth daily   latanoprost (XALATAN) 0 005 % ophthalmic solution   Yes No   Sig: Apply 1 drop to eye   multivitamin (THERAGRAN) TABS   Yes No   Sig: Take 1 tablet by mouth daily   rosuvastatin (CRESTOR) 20 MG tablet   Yes No   Sig: Take 20 mg by mouth daily      Facility-Administered Medications: None       Past Medical History:   Diagnosis Date    Heart disease     Hypertension        History reviewed   No pertinent surgical history  History reviewed  No pertinent family history  I have reviewed and agree with the history as documented  E-Cigarette/Vaping    E-Cigarette Use Never User      E-Cigarette/Vaping Substances     Social History     Tobacco Use    Smoking status: Never Smoker    Smokeless tobacco: Never Used   Substance Use Topics    Alcohol use: Never     Frequency: Never    Drug use: Never       Review of Systems   Constitutional: Positive for diaphoresis  Negative for activity change, appetite change, chills, fatigue, fever and unexpected weight change  HENT: Negative  Eyes: Negative  Respiratory: Positive for shortness of breath  Negative for choking, chest tightness and wheezing  Cardiovascular: Negative  Gastrointestinal: Negative  Endocrine: Negative  Genitourinary: Negative  Musculoskeletal: Negative  Skin: Negative  Neurological: Positive for dizziness and weakness  Negative for tremors, syncope and speech difficulty  Psychiatric/Behavioral: Negative  Physical Exam  Physical Exam  Vitals signs and nursing note reviewed  Constitutional:       General: He is in acute distress (secondary to bradycardia)  Appearance: He is diaphoretic  HENT:      Head: Normocephalic and atraumatic  Eyes:      Extraocular Movements: Extraocular movements intact  Pupils: Pupils are equal, round, and reactive to light  Neck:      Musculoskeletal: Normal range of motion and neck supple  Cardiovascular:      Rate and Rhythm: Bradycardia present  Comments: Complete heart block  Pulmonary:      Effort: Tachypnea present  Breath sounds: Decreased breath sounds present  Abdominal:      General: Bowel sounds are normal       Palpations: Abdomen is soft  Musculoskeletal: Normal range of motion  Skin:     General: Skin is warm  Neurological:      General: No focal deficit present  Mental Status: He is alert and oriented to person, place, and time  Psychiatric:         Mood and Affect: Mood is anxious  Vital Signs  ED Triage Vitals [03/26/21 1401]   Temperature Pulse Respirations Blood Pressure SpO2   98 6 °F (37 °C) (!) 27 18 169/72 96 %      Temp Source Heart Rate Source Patient Position - Orthostatic VS BP Location FiO2 (%)   Tympanic Monitor Lying Right arm --      Pain Score       --           Vitals:    03/26/21 1440 03/26/21 1445 03/26/21 1450 03/26/21 1455   BP: 131/58 118/58 114/56    Pulse: (!) 48 (!) 40 (!) 34 (!) 36   Patient Position - Orthostatic VS: Lying Lying Lying          Visual Acuity      ED Medications  Medications   DOPamine (INTROPIN) 400 mg in 250 mL infusion (premix) (7 5 mcg/kg/min × 127 kg Intravenous Rate/Dose Change 3/26/21 1421)   atropine injection 1 mg (1 mg Intravenous Given 3/26/21 1403)       Diagnostic Studies  Results Reviewed     Procedure Component Value Units Date/Time    COVID19, Influenza A/B, RSV PCR, [facility] [[id number]]  (Normal) Collected: 03/26/21 1413    Lab Status: Final result Specimen: Nares from Nasopharyngeal Swab Updated: 03/26/21 1507     SARS-CoV-2 Negative     INFLUENZA A PCR Negative     INFLUENZA B PCR Negative     RSV PCR Negative    Narrative: This test has been authorized by FDA under an EUA (Emergency Use Assay) for use by authorized laboratories  Clinical caution and judgement should be used with the interpretation of these results with consideration of the clinical impression and other laboratory testing  Testing reported as "Positive" or "Negative" has been proven to be accurate according to standard laboratory validation requirements  All testing is performed with control materials showing appropriate reactivity at standard intervals      B-Type Natriuretic Peptide [facility] ONLY) [[id number]]  (Abnormal) Collected: 03/26/21 1408    Lab Status: Final result Specimen: Blood from Arm, Left Updated: 03/26/21 1439      pg/mL     Basic metabolic panel [[id number]] (Abnormal) Collected: 03/26/21 1408    Lab Status: Final result Specimen: Blood from Arm, Left Updated: 03/26/21 1439     Sodium 136 mmol/L      Potassium 5 0 mmol/L      Chloride 102 mmol/L      CO2 24 mmol/L      ANION GAP 10 mmol/L      BUN 45 mg/dL      Creatinine 1 99 mg/dL      Glucose 119 mg/dL      Calcium 9 2 mg/dL      eGFR 30 ml/min/1 73sq m     Narrative:      [place] guidelines for Chronic Kidney Disease (CKD):     Stage 1 with normal or high GFR (GFR > 90 mL/min/1 73 square meters)    Stage 2 Mild CKD (GFR = 60-89 mL/min/1 73 square meters)    Stage 3A Moderate CKD (GFR = 45-59 mL/min/1 73 square meters)    Stage 3B Moderate CKD (GFR = 30-44 mL/min/1 73 square meters)    Stage 4 Severe CKD (GFR = 15-29 mL/min/1 73 square meters)    Stage 5 End Stage CKD (GFR <15 mL/min/1 73 square meters)  Note: GFR calculation is accurate only with a steady state creatinine    Magnesium [[id number]]  (Normal) Collected: 03/26/21 1408    Lab Status: Final result Specimen: Blood from Arm, Left Updated: 03/26/21 1436     Magnesium 2 2 mg/dL     Phosphorus [[id number]]  (Normal) Collected: 03/26/21 1408    Lab Status: Final result Specimen: Blood from Arm, Left Updated: 03/26/21 1436     Phosphorus 3 6 mg/dL     Troponin I [[id number]]  (Abnormal) Collected: 03/26/21 1408    Lab Status: Final result Specimen: Blood from Arm, Left Updated: 03/26/21 1435     Troponin I 0 04 ng/mL     Protime-INR [[id number]]  (Abnormal) Collected: 03/26/21 1408    Lab Status: Final result Specimen: Blood from Arm, Left Updated: 03/26/21 1427     Protime 15 3 seconds      INR 1 22    CBC and differential [[id number]]  (Abnormal) Collected: 03/26/21 1408    Lab Status: Final result Specimen: Blood from Arm, Left Updated: 03/26/21 1416     WBC 11 10 Thousand/uL      RBC 4 36 Million/uL      Hemoglobin 14 2 g/dL      Hematocrit 43 6 %       fL      MCH 32 6 pg      MCHC 32 6 g/dL      RDW 14 2 %      MPV 10 0 fL      Platelets 510 Thousands/uL      Neutrophils Relative 64 %      Lymphocytes Relative 21 %      Monocytes Relative 13 %      Eosinophils Relative 1 %      Basophils Relative 1 %      Neutrophils Absolute 7 10 Thousands/µL      Lymphocytes Absolute 2 30 Thousands/µL      Monocytes Absolute 1 50 Thousand/µL      Eosinophils Absolute 0 10 Thousand/µL      Basophils Absolute 0 10 Thousands/µL                  XR chest 1 view portable    (Results Pending)              Procedures  CriticalCare Time  Performed by: Polly Sanchez MD  Authorized by: Polly Sanchez MD     Critical care provider statement:     Critical care time (minutes):  70    Critical care start time:  3/26/2021 1:40 PM    Critical care end time:  3/26/2021 3:14 PM    Critical care time was exclusive of:  Separately billable procedures and treating other patients and teaching time    Critical care was necessary to treat or prevent imminent or life-threatening deterioration of the following conditions:  Cardiac failure, circulatory failure, renal failure, respiratory failure and dehydration    Critical care was time spent personally by me on the following activities:  Blood draw for specimens, obtaining history from patient or surrogate, development of treatment plan with patient or surrogate, discussions with consultants, discussions with primary provider, evaluation of patient's response to treatment, examination of patient, review of old charts, re-evaluation of patient's condition, ordering and review of radiographic studies, ordering and review of laboratory studies, ordering and performing treatments and interventions and interpretation of cardiac output measurements    I assumed direction of critical care for this patient from another provider in my specialty: no               ED Course  ED Course as of Mar 26 1517   Fri Mar 26, 2021   1410 Case discuss with Dr Raphael Kaplan ([address] [place] [address] from [place]) accepted the transfer    Recommending dopamine at 5 titrate up 2 5 up to 10  Discussed with patient and wife who is by bedside agree with plan  1411 Patient given after pain without any change  Blood pressure is stable will continue to monitor  MDM  Number of Diagnoses or Management Options  Acute renal failure (ARF) ([facility] [address] ):   Complete heart block [facility]):   Elevated troponin:   Symptomatic bradycardia:       Disposition  Final diagnoses:   Complete heart block (HCC)   Symptomatic bradycardia   Elevated troponin   Acute renal failure (ARF) ([facility] [address] )     Time reflects when diagnosis was documented in both MDM as applicable and the Disposition within this note     Time User Action Codes Description Comment    3/26/2021  2:13 PM Karla Riggins [I44 2] Complete heart block ([facility] [address] )     3/26/2021  2:13 PM Lam Villalpando Add [R00 1] Symptomatic bradycardia     3/26/2021  3:10 PM Capitan Rhody Add [R77 8] Elevated troponin     3/26/2021  3:[facility] [N17 9] Acute renal failure (ARF) [facility])       ED Disposition     ED Disposition Condition Date/Time Comment    Transfer to Another Facility-In Network  Fri Mar 26, 2021  2:12 PM Daniel Daljit should be transferred out to [facility]          MD Documentation      Most Recent Value   Patient Condition  The patient has been stabilized such that within reasonable medical probability, no material deterioration of the patient condition or the condition of the unborn child(liam) is likely to result from the transfer   Reason for Transfer  Level of Care needed not available at this facility   Benefits of Transfer  Specialized equipment and/or services available at the receiving facility (Include comment)________________________   Risks of Transfer  Potential for delay in receiving treatment, Potential deterioration of medical condition, Loss of IV, Increased discomfort during transfer, Possible worsening of condition or death during transfer   Accepting Physician  Angelica Payne Accepting Facility Name, [address] by (Company and Unit #)  [address], [address] [facility] [phone]   Transported by (Company and Unit #)  [facility]FFILIATED WITH [facility]      Follow-up Information    None         Patient's Medications   Discharge Prescriptions    No medications on file     No discharge procedures on file      PDMP Review     None          ED Provider  Electronically Signed by           Donal Fournier MD  03/26/21 9714

## 2021-03-26 NOTE — OP NOTE
ELECTROPHYSIOLOGY  OPERATIVE REPORT  PATIENT NAME: Denise Phillips  :  1934  MRN: [id number]  Date of surgery: 21  Surgeon: Sreekanth Mckeon MD  Pt Location: Cath Lab    PROCEDURE PERFORMED:   1)Temporary Screw-in Pacemaker    Preoperative Medications:   ANESTHESIA: Local    PREOPERATIVE DIAGNOSIS:   1  Complete heart block requiring transcutaneous pacing  2  CAD s/p CABG  3  Morbid obesity     POSTOPERATIVE DIAGNOSIS: Successful temporary screw-in pacemaker implant  Same as Preop  Informed Consent: Risks, benefits, and alternatives discussed with patient and any family present  He understands risks, which include but are not limited to life threatening  bleeding, infection, air around lungs, blood around the heart and reoperation dislodged or malfunctioning device  Procedure Description:  Patient written consent was obtained following full risk/benefit discussion  A time-out was conducted in the EP lab  Pre-procedure IV antibiotic was given  The patient was prepped and draped in sterile fashion  Local anesthetic was used  The right neck was prepped and draped in sterile fashion  Vascular ultrasound was used to guide access with a Cook needle to the right internal jugular vein, and a J-wire was advanced to the right atrium and down to the IVC to verify venous access  A 7 Fr peel-away sheath was introduced over the wire  An active fixation pacemaker lead was introduced via the right IJ vein and advanced to the right ventricle  After the lead was advanced to the right ventricle patient lost capture through the transcutaneous pacing  He had likely VF episode and 200 joule shock was given  He shortly after again went into another episode of VF while CPR was performed  Another 200 joule shock was given and CPR was resumed  Patient still did not have mental status and femoral pulse was not palpable  Another 360 joule shock was given    Patient then regained consciousness and had strong femoral pulses  Blood pressure was 101/54 mmHg  The helix was deployed, a current of injury was seen, and the sensing and pacing threshold parameters were good  Threshold was noted to be at 1 75 at 0 5 milliseconds  Lead slack was introduced to ensure that head and neck motion would not apply traction to the lead tip  A silk purse-string suture was tied around the skin entry site, the sheath was removed, and the purse-string was tied, achieving hemostasis  The suture sleeve was tied to the skin with silk suture, with good fixation  A Tegaderm dressing was applied over the pacemaker lead insertion site  A re-sterilized pacemaker was connected to the pacemaker lead  Interrogation of the temporary "externalized" pacemaker revealed good sensing and pacing parameters  The pacemaker was secured to the skin on the side of the neck  A gauze dressing and Tegaderm adhesive were placed over the "externalized" temporary pacemaker system  The temporary pacemaker was programmed VVI 60 ppm, with an output of 8V at 1ms  EBL: Minimal  Complications: None  Contrast:None  Findings: Successful placement of temporary screw-in pacemaker through right IJ  During the procedure, patient had VF arrest from not being captured through transcutaneous pacing  Brief ACLS protocol was performed and ROSC was returned  Three external shocks were given  Patient did not have complication  The patient tolerated the procedure well  Plan: Routine postoperative care, CXR, and overnight observation with telemetry  Chest x-ray portable on 03/27/21  Plan for PPM placement in coming days  Obtain ECHO to evaluate EF

## 2021-03-26 NOTE — CONSULTS
Consultation - Electrophysiology-Cardiology (EP)   Denise Phillips 80 y o  male MRN: [id number]  Unit/Bed#: [facility] 513-01 Encounter: [id number]      Consults    Assessment/Plan     Assessment:  1  Complete heart block with wide ventricular escape, on dopamine and being transcutaneously paced upon presentation  2  CAD status post remote CABG (1989?)   A ) per catheterization report from 11/2018, patent LIMA and free radical arterial graft off the mid LIMA to distal LAD, D2, OM1, and mid RPDA   B ) diffuse CAD in moderate size distal vessels being medically managed   C ) on aspirin and statin as an outpatient  3  Chronic HFpEF, with LV systolic function 51% per reports from 2018  4  Hypertension  5  Hyperlipidemia  6  Elevated creatinine, unclear baseline      Plan:  Patient was immediately taken to cath lab for emergent pacemaker placement  Brief chart review was performed to obtain the above history, however given that this is an emergency and we are unclear of his complete history will proceed with temporary permanent pacemaker placement so that he will be stable throughout the weekend  Can then perform more detailed history and physical, and obtain an echocardiogram to determine the best device moving forward (dual-chamber versus biventricular +/- ICD if significantly reduced EF)  Given his history of CAD, may need to consider ischemic evaluation  Will appreciate general cardiology input over the weekend  During the procedure, he went into what appeared to be VT and was externally shocked x 2  Please see procedure notes by Dr Mara William for full details  Temp perm was successfully placed with appropriate capture, and patient was hemodynamically stable by the end of the procedure  Dopamine was weaned to off         History of Present Illness   Physician Requesting Consult: Brandon Samaniego MD  Reason for Consult / Principal Problem: CHB    HPI: Denise Phillips is a 80y o  year old male with CAD status post remote CABG, suspected chronic HFpEF, hypertension, and hyperlipidemia  History is obtained from the chart given the emergent circumstances during which he was seen  He underwent cardiac catheterization 11/2018 at [facility], with findings as noted above  Per that note, his LVEF was 66%  Outpatient medications include aspirin, statin, Lasix, and benazepril  There are no beta-blockers or calcium channel blockers noted  He reportedly presented to the ED at [facility] with several weeks of shortness of breath  He was found to be in complete heart block with heart rate of 20 and a wide ventricular escape  He was started on a dopamine drip, and was transferred to [facility]  Patient was discussed with critical care prior to transfer  Review of Systems  ROS as noted above, otherwise 12 point review of systems was performed and is negative  Historical Information   Past Medical History:   Diagnosis Date    Heart disease     Hypertension      History reviewed  No pertinent surgical history  Social History     Substance and Sexual Activity   Alcohol Use Never    Frequency: Never     Social History     Substance and Sexual Activity   Drug Use Never     Social History     Tobacco Use   Smoking Status Never Smoker   Smokeless Tobacco Never Used     Family History: History reviewed  No pertinent family history      Meds/Allergies   Hospital Medications:   Current Facility-Administered Medications   Medication Dose Route Frequency    [START ON 3/27/2021] atorvastatin (LIPITOR) tablet 40 mg  40 mg Oral Daily With Dinner    heparin (porcine) subcutaneous injection 5,000 Units  5,000 Units Subcutaneous Q8H [address]    lidocaine (PF) (XYLOCAINE-MPF) 1 % injection    Code/Trauma/Sedation Med     Home Medications:   Medications Prior to Admission   Medication    allopurinol (ZYLOPRIM) 300 mg tablet    aspirin 325 mg tablet    benazepril (LOTENSIN) 10 mg tablet    furosemide (LASIX) 20 mg tablet    isosorbide mononitrate (IMDUR) 60 mg 24 hr tablet    latanoprost (XALATAN) 0 005 % ophthalmic solution    multivitamin (THERAGRAN) TABS    rosuvastatin (CRESTOR) 20 MG tablet       No Known Allergies    Objective   Vitals: There were no vitals taken for this visit  No intake or output data in the 24 hours ending 03/26/21 1722    Invasive Devices     Peripheral Intravenous Line            Peripheral IV 03/26/21 Left Antecubital less than 1 day    Peripheral IV 03/26/21 Right Wrist less than 1 day                Physical Exam   GEN: alert and oriented, in no acute distress despite being transcutaneously paced  SKIN: dry without significant lesions or rashes  HEENT: NCAT, PERRL, EOMs intact  NECK: No JVD appreciated  CARDIOVASCULAR: RRR, being transcutaneously paced  LUNGS:  No respiratory distress, good overall effort, no audible wheezing  ABDOMEN: Soft, nontender, nondistended  EXTREMITIES/VASCULAR: perfused without clubbing, cyanosis; + LE edema b/l  PSYCH: Normal mood and affect  NEURO: CN ll-Xll grossly intact        Lab Results: I have personally reviewed pertinent lab results  Results from last 7 days   Lab Units 03/26/21  1408   WBC Thousand/uL 11 10*   HEMOGLOBIN g/dL 14 2   HEMATOCRIT % 43 6   PLATELETS Thousands/uL 212     Results from last 7 days   Lab Units 03/26/21  1408   POTASSIUM mmol/L 5 0   CHLORIDE mmol/L 102   CO2 mmol/L 24   BUN mg/dL 45*   CREATININE mg/dL 1 99*   CALCIUM mg/dL 9 2     Results from last 7 days   Lab Units 03/26/21  1408   INR  1 22*     Results from last 7 days   Lab Units 03/26/21  1408   MAGNESIUM mg/dL 2 2       Imaging: I have personally reviewed pertinent reports  ECHO: No results found for this or any previous visit  CATH 11/2008 AT [facility]: Assessment:   1  Severe native 3 Vessel coronary artery disease  2  Patent LIMA and free radial arterial graft off mid LIMA to distal LAD, D2, OM1, and mid RPDA  3  Normal LV size and normal LV EF 66% with normal wall motion  3  Normal central aortic and LV pressure with mildly elevated LVEDP  4  The patient's chest pain syndrome is atypical and there is no obvious culprit lesion  The arterial grafts from 1989 remain widely patent  There is diffuse CAD in moderate sized distal vessels  Medical therapy and risk factor modification is recommended         EKG:       VTE Prophylaxis: Sequential compression device (Venodyne)

## 2021-03-26 NOTE — ED NOTES
Received call from [facility]  Pt will be transferred to [facility] Room 513 by [facility]  Pt accepted by Dr Candance Plummer  Nurse to nurse report to be called to [phone]  Primary RN made aware of same        Arpit Oconnor RN  03/26/21 0108

## 2021-03-26 NOTE — ASSESSMENT & PLAN NOTE
· Likely related to ischemic heart disease  · POD #2 s/p temp-perm pacemaker  · EP following appreciate recommendations  · Continues to fully pace at 60 bpm  · Hold all AV ruth blocking agents    · Likely plan for dual chamber PPM on Monday   ?  EF 60%, no ICD indication   · TSH WNL   · Optimize electrolytes, K > 4 0, mag > 2 0

## 2021-03-26 NOTE — EMTALA/ACUTE CARE TRANSFER
[facility]  [address] [address]  [phone]  Dept: [phone]      EMTALA TRANSFER CONSENT    NAME Constantino DEWEY 1934                              MRN [id number]    I have been informed of my rights regarding examination, treatment, and transfer   by Dr May Harris MD    Benefits:      Risks:        Consent for Transfer:  I acknowledge that my medical condition has been evaluated and explained to me by the emergency department physician or other qualified medical person and/or my attending physician, who has recommended that I be transferred to the service of    at    The above potential benefits of such transfer, the potential risks associated with such transfer, and the probable risks of not being transferred have been explained to me, and I fully understand them  The doctor has explained that, in my case, the benefits of transfer outweigh the risks  I agree to be transferred  I authorize the performance of emergency medical procedures and treatments upon me in both transit and upon arrival at the receiving facility  Additionally, I authorize the release of any and all medical records to the receiving facility and request they be transported with me, if possible  I understand that the safest mode of transportation during a medical emergency is an ambulance and that the Hospital advocates the use of this mode of transport  Risks of traveling to the receiving facility by car, including absence of medical control, life sustaining equipment, such as oxygen, and medical personnel has been explained to me and I fully understand them  (ANN MARIE CORRECT BOX BELOW)  [  ]  I consent to the stated transfer and to be transported by ambulance/helicopter  [  ]  I consent to the stated transfer, but refuse transportation by ambulance and accept full responsibility for my transportation by car    I understand the risks of non-ambulance transfers and I exonerate the Hospital and its staff from any deterioration in my condition that results from this refusal     X___________________________________________    DATE  21  TIME________  Signature of patient or legally responsible individual signing on patient behalf           RELATIONSHIP TO PATIENT_________________________          Provider [address] [place] [address] 10                                         1934                              MRN [id number]    A medical screening exam was performed on the above named patient  Based on the examination:    Condition Necessitating Transfer The primary encounter diagnosis was Complete heart block ([address] )  A diagnosis of Symptomatic bradycardia was also pertinent to this visit  Patient Condition:      Reason for Transfer:      Transfer Requirements: Facility     · Space available and qualified personnel available for treatment as acknowledged by    · Agreed to accept transfer and to provide appropriate medical treatment as acknowledged by          · Appropriate medical records of the examination and treatment of the patient are provided at the time of transfer   [address] [facility], [address] _______  · Transfer will be performed by qualified personnel from    and appropriate transfer equipment as required, including the use of necessary and appropriate life support measures      Provider Certification: I have examined the patient and explained the following risks and benefits of being transferred/refusing transfer to the patient/family:         Based on these reasonable risks and benefits to the patient and/or the unborn child(liam), and based upon the information available at the time of the patients examination, I certify that the medical benefits reasonably to be expected from the provision of appropriate medical treatments at another medical facility outweigh the increasing risks, if any, to the individuals medical condition, and in the case of labor to the unborn child, from effecting the transfer      X____________________________________________ DATE 03/26/21        TIME_______      ORIGINAL - SEND TO MEDICAL RECORDS   COPY - SEND WITH PATIENT DURING TRANSFER

## 2021-03-26 NOTE — ASSESSMENT & PLAN NOTE
· Currently hemodynamically WNL  · Maintain MAP > 65  · Maintain SBP < 180  · Resume home Imdur 90mg daily today   · Holding home antihypertensives at this time:  ? Benzapril 10mg daily due to JAMIE   · VS per ICU protocol

## 2021-03-26 NOTE — ASSESSMENT & PLAN NOTE
· 3/27/21 Lipid panel:           ? Total cholesterol: 80  ? Triglyceride: 91  § LDL: 25  ? HDL: 37  · Atorvastatin 40mg qHS   ?  In place of home rosuvastatin 20mg daily

## 2021-03-27 ENCOUNTER — APPOINTMENT (INPATIENT)
Dept: NON INVASIVE DIAGNOSTICS | Facility: HOSPITAL | Age: 86
DRG: 242 | End: 2021-03-27
Payer: COMMERCIAL

## 2021-03-27 PROBLEM — G45.9 TIA (TRANSIENT ISCHEMIC ATTACK): Status: ACTIVE | Noted: 2018-11-14

## 2021-03-27 PROBLEM — E78.5 HYPERLIPIDEMIA: Chronic | Status: ACTIVE | Noted: 2021-03-26

## 2021-03-27 PROBLEM — G45.9 TIA (TRANSIENT ISCHEMIC ATTACK): Chronic | Status: ACTIVE | Noted: 2018-11-14

## 2021-03-27 PROBLEM — I10 HYPERTENSION: Chronic | Status: ACTIVE | Noted: 2021-03-26

## 2021-03-27 PROBLEM — D72.829 LEUKOCYTOSIS: Status: RESOLVED | Noted: 2021-03-26 | Resolved: 2021-03-27

## 2021-03-27 PROBLEM — R68.89 EXERCISE INTOLERANCE: Status: RESOLVED | Noted: 2021-03-26 | Resolved: 2021-03-27

## 2021-03-27 PROBLEM — I25.708 CORONARY ARTERY DISEASE OF BYPASS GRAFT OF NATIVE HEART WITH STABLE ANGINA PECTORIS (HCC): Status: ACTIVE | Noted: 2018-11-14

## 2021-03-27 PROBLEM — E66.01 MORBID OBESITY (HCC): Status: ACTIVE | Noted: 2018-11-14

## 2021-03-27 PROBLEM — E66.01 MORBID OBESITY (HCC): Chronic | Status: ACTIVE | Noted: 2018-11-14

## 2021-03-27 PROBLEM — M10.9 GOUT: Chronic | Status: ACTIVE | Noted: 2021-03-26

## 2021-03-27 PROBLEM — I25.708 CORONARY ARTERY DISEASE OF BYPASS GRAFT OF NATIVE HEART WITH STABLE ANGINA PECTORIS (HCC): Chronic | Status: ACTIVE | Noted: 2018-11-14

## 2021-03-27 LAB
ALBUMIN SERPL BCP-MCNC: 3.2 G/DL (ref 3.5–5)
ALP SERPL-CCNC: 50 U/L (ref 46–116)
ALT SERPL W P-5'-P-CCNC: 24 U/L (ref 12–78)
ANION GAP SERPL CALCULATED.3IONS-SCNC: 5 MMOL/L (ref 4–13)
ANION GAP SERPL CALCULATED.3IONS-SCNC: 7 MMOL/L (ref 4–13)
ANION GAP SERPL CALCULATED.3IONS-SCNC: 8 MMOL/L (ref 4–13)
AST SERPL W P-5'-P-CCNC: 18 U/L (ref 5–45)
BASOPHILS # BLD AUTO: 0.03 THOUSANDS/ΜL (ref 0–0.1)
BASOPHILS NFR BLD AUTO: 0 % (ref 0–1)
BILIRUB DIRECT SERPL-MCNC: 0.33 MG/DL (ref 0–0.2)
BILIRUB SERPL-MCNC: 0.98 MG/DL (ref 0.2–1)
BUN SERPL-MCNC: 41 MG/DL (ref 5–25)
BUN SERPL-MCNC: 42 MG/DL (ref 5–25)
BUN SERPL-MCNC: 46 MG/DL (ref 5–25)
CA-I BLD-SCNC: 1.16 MMOL/L (ref 1.12–1.32)
CALCIUM SERPL-MCNC: 8.6 MG/DL (ref 8.3–10.1)
CALCIUM SERPL-MCNC: 8.7 MG/DL (ref 8.3–10.1)
CALCIUM SERPL-MCNC: 9.1 MG/DL (ref 8.3–10.1)
CHLORIDE SERPL-SCNC: 107 MMOL/L (ref 100–108)
CHLORIDE SERPL-SCNC: 108 MMOL/L (ref 100–108)
CHLORIDE SERPL-SCNC: 111 MMOL/L (ref 100–108)
CHOLEST SERPL-MCNC: 80 MG/DL (ref 50–200)
CO2 SERPL-SCNC: 23 MMOL/L (ref 21–32)
CO2 SERPL-SCNC: 24 MMOL/L (ref 21–32)
CO2 SERPL-SCNC: 25 MMOL/L (ref 21–32)
CREAT SERPL-MCNC: 1.8 MG/DL (ref 0.6–1.3)
CREAT SERPL-MCNC: 1.91 MG/DL (ref 0.6–1.3)
CREAT SERPL-MCNC: 2.05 MG/DL (ref 0.6–1.3)
EOSINOPHIL # BLD AUTO: 0.07 THOUSAND/ΜL (ref 0–0.61)
EOSINOPHIL NFR BLD AUTO: 1 % (ref 0–6)
ERYTHROCYTE [DISTWIDTH] IN BLOOD BY AUTOMATED COUNT: 13.5 % (ref 11.6–15.1)
EST. AVERAGE GLUCOSE BLD GHB EST-MCNC: 120 MG/DL
GFR SERPL CREATININE-BSD FRML MDRD: 28 ML/MIN/1.73SQ M
GFR SERPL CREATININE-BSD FRML MDRD: 31 ML/MIN/1.73SQ M
GFR SERPL CREATININE-BSD FRML MDRD: 33 ML/MIN/1.73SQ M
GLUCOSE SERPL-MCNC: 104 MG/DL (ref 65–140)
GLUCOSE SERPL-MCNC: 106 MG/DL (ref 65–140)
GLUCOSE SERPL-MCNC: 108 MG/DL (ref 65–140)
GLUCOSE SERPL-MCNC: 114 MG/DL (ref 65–140)
GLUCOSE SERPL-MCNC: 123 MG/DL (ref 65–140)
GLUCOSE SERPL-MCNC: 88 MG/DL (ref 65–140)
GLUCOSE SERPL-MCNC: 94 MG/DL (ref 65–140)
HBA1C MFR BLD: 5.8 %
HCT VFR BLD AUTO: 40.4 % (ref 36.5–49.3)
HDLC SERPL-MCNC: 37 MG/DL
HGB BLD-MCNC: 13.3 G/DL (ref 12–17)
IMM GRANULOCYTES # BLD AUTO: 0.03 THOUSAND/UL (ref 0–0.2)
IMM GRANULOCYTES NFR BLD AUTO: 0 % (ref 0–2)
LDLC SERPL CALC-MCNC: 25 MG/DL (ref 0–100)
LYMPHOCYTES # BLD AUTO: 1.51 THOUSANDS/ΜL (ref 0.6–4.47)
LYMPHOCYTES NFR BLD AUTO: 17 % (ref 14–44)
MAGNESIUM SERPL-MCNC: 2.3 MG/DL (ref 1.6–2.6)
MCH RBC QN AUTO: 32.9 PG (ref 26.8–34.3)
MCHC RBC AUTO-ENTMCNC: 32.9 G/DL (ref 31.4–37.4)
MCV RBC AUTO: 100 FL (ref 82–98)
MONOCYTES # BLD AUTO: 0.92 THOUSAND/ΜL (ref 0.17–1.22)
MONOCYTES NFR BLD AUTO: 10 % (ref 4–12)
NEUTROPHILS # BLD AUTO: 6.54 THOUSANDS/ΜL (ref 1.85–7.62)
NEUTS SEG NFR BLD AUTO: 72 % (ref 43–75)
NONHDLC SERPL-MCNC: 43 MG/DL
NRBC BLD AUTO-RTO: 0 /100 WBCS
PHOSPHATE SERPL-MCNC: 4.1 MG/DL (ref 2.3–4.1)
PLATELET # BLD AUTO: 188 THOUSANDS/UL (ref 149–390)
PMV BLD AUTO: 11.1 FL (ref 8.9–12.7)
POTASSIUM SERPL-SCNC: 4.6 MMOL/L (ref 3.5–5.3)
POTASSIUM SERPL-SCNC: 5.1 MMOL/L (ref 3.5–5.3)
POTASSIUM SERPL-SCNC: 5.3 MMOL/L (ref 3.5–5.3)
PROT SERPL-MCNC: 6.3 G/DL (ref 6.4–8.2)
RBC # BLD AUTO: 4.04 MILLION/UL (ref 3.88–5.62)
SODIUM SERPL-SCNC: 138 MMOL/L (ref 136–145)
SODIUM SERPL-SCNC: 139 MMOL/L (ref 136–145)
SODIUM SERPL-SCNC: 141 MMOL/L (ref 136–145)
TRIGL SERPL-MCNC: 91 MG/DL
TSH SERPL DL<=0.05 MIU/L-ACNC: 2.62 UIU/ML (ref 0.36–3.74)
WBC # BLD AUTO: 9.1 THOUSAND/UL (ref 4.31–10.16)

## 2021-03-27 PROCEDURE — 82330 ASSAY OF CALCIUM: CPT | Performed by: NURSE PRACTITIONER

## 2021-03-27 PROCEDURE — 93306 TTE W/DOPPLER COMPLETE: CPT | Performed by: INTERNAL MEDICINE

## 2021-03-27 PROCEDURE — 99233 SBSQ HOSP IP/OBS HIGH 50: CPT | Performed by: ANESTHESIOLOGY

## 2021-03-27 PROCEDURE — 82948 REAGENT STRIP/BLOOD GLUCOSE: CPT

## 2021-03-27 PROCEDURE — 80048 BASIC METABOLIC PNL TOTAL CA: CPT | Performed by: PHYSICIAN ASSISTANT

## 2021-03-27 PROCEDURE — 84100 ASSAY OF PHOSPHORUS: CPT | Performed by: NURSE PRACTITIONER

## 2021-03-27 PROCEDURE — 80076 HEPATIC FUNCTION PANEL: CPT | Performed by: NURSE PRACTITIONER

## 2021-03-27 PROCEDURE — C8929 TTE W OR WO FOL WCON,DOPPLER: HCPCS

## 2021-03-27 PROCEDURE — 83036 HEMOGLOBIN GLYCOSYLATED A1C: CPT | Performed by: NURSE PRACTITIONER

## 2021-03-27 PROCEDURE — 84443 ASSAY THYROID STIM HORMONE: CPT | Performed by: NURSE PRACTITIONER

## 2021-03-27 PROCEDURE — 99232 SBSQ HOSP IP/OBS MODERATE 35: CPT | Performed by: INTERNAL MEDICINE

## 2021-03-27 PROCEDURE — 85025 COMPLETE CBC W/AUTO DIFF WBC: CPT | Performed by: PHYSICIAN ASSISTANT

## 2021-03-27 PROCEDURE — 83735 ASSAY OF MAGNESIUM: CPT | Performed by: PHYSICIAN ASSISTANT

## 2021-03-27 PROCEDURE — 80061 LIPID PANEL: CPT | Performed by: NURSE PRACTITIONER

## 2021-03-27 RX ORDER — POTASSIUM CHLORIDE 750 MG/1
10 TABLET, EXTENDED RELEASE ORAL 2 TIMES DAILY
COMMUNITY
End: 2021-04-26 | Stop reason: SDUPTHER

## 2021-03-27 RX ORDER — FUROSEMIDE 10 MG/ML
40 INJECTION INTRAMUSCULAR; INTRAVENOUS ONCE
Status: COMPLETED | OUTPATIENT
Start: 2021-03-27 | End: 2021-03-27

## 2021-03-27 RX ORDER — ANTIOX #8/OM3/DHA/EPA/LUT/ZEAX 250-2.5 MG
1 CAPSULE ORAL 2 TIMES DAILY
Status: DISCONTINUED | OUTPATIENT
Start: 2021-03-27 | End: 2021-03-30 | Stop reason: HOSPADM

## 2021-03-27 RX ORDER — AMLODIPINE BESYLATE 5 MG/1
5 TABLET ORAL DAILY
COMMUNITY

## 2021-03-27 RX ORDER — OMEPRAZOLE 20 MG/1
20 TABLET, DELAYED RELEASE ORAL DAILY
COMMUNITY

## 2021-03-27 RX ADMIN — HEPARIN SODIUM 5000 UNITS: 5000 INJECTION INTRAVENOUS; SUBCUTANEOUS at 06:20

## 2021-03-27 RX ADMIN — HEPARIN SODIUM 5000 UNITS: 5000 INJECTION INTRAVENOUS; SUBCUTANEOUS at 21:58

## 2021-03-27 RX ADMIN — PERFLUTREN 0.4 ML/MIN: 6.52 INJECTION, SUSPENSION INTRAVENOUS at 10:38

## 2021-03-27 RX ADMIN — HEPARIN SODIUM 5000 UNITS: 5000 INJECTION INTRAVENOUS; SUBCUTANEOUS at 13:31

## 2021-03-27 RX ADMIN — ASPIRIN 81 MG: 81 TABLET, COATED ORAL at 08:42

## 2021-03-27 RX ADMIN — FUROSEMIDE 40 MG: 10 INJECTION, SOLUTION INTRAVENOUS at 10:39

## 2021-03-27 RX ADMIN — MELATONIN TAB 3 MG 6 MG: 3 TAB at 21:58

## 2021-03-27 RX ADMIN — ATORVASTATIN CALCIUM 40 MG: 40 TABLET, FILM COATED ORAL at 16:07

## 2021-03-27 NOTE — PROGRESS NOTES
Cardiology Progress Note - Constantino Hansen 80 y o  male MRN: [id number]    Unit/Bed#: [facility] 513-01 Encounter: [id number]      Assessment/Recommendations:  1  Complete heart block: With wide ventricular escape  Status post temporary screw-in pacemaker  Will need echocardiogram to assess LV function and determine whether patient would benefit from CRT and/or ICD  2  CAD:  With history of remote CABG  Catheterization in November 2018 revealed a patent LIMA and free radial arterial graft off mid LIMA to distal LAD, D1, OM1, and mid right PDA  Continued on medical management with aspirin, statin  No beta-blocker currently due to complete heart block  3  Mild volume overload:  Ejection fraction noted to be normal in 2018  Will need re-evaluation with echocardiogram this admission  Agree with intermittent Lasix dosing as needed  4  Hypertension:  Well controlled  Will eventually need beta-blocker once pacemaker is implanted  5  Hyperlipidemia:  Continue on statin  6  Acute kidney injury:  Unclear baseline for creatinine  Seems to be improving with improved perfusion with pacing  7  Non MI related troponin elevation:  Very borderline, likely secondary to complete heart block and reduced perfusion as a result  For now, can hold off on ischemic evaluation other than with echocardiogram       Subjective:   Patient seen and examined  Status post temporary screw-in pacemaker implant overnight   ; pertinent negatives - chest pain, chest pressure/discomfort, dyspnea, irregular heart beat, lower extremity edema and palpitations  Objective:     Vitals: Blood pressure 115/60, pulse 60, temperature 98 6 °F (37 °C), temperature source Axillary, resp  rate 22, SpO2 95 %  , There is no height or weight on file to calculate BMI ,   Orthostatic Blood Pressures      Most Recent Value   Blood Pressure  115/60 filed at 03/27/2021 0700            Intake/Output Summary (Last 24 hours) at 3/27/2021 1037  Last data filed at 3/27/2021 0634  Gross per 24 hour   Intake 500 ml   Output 950 ml   Net -450 ml       TELE:  Ventricular pacing    Physical Exam:    GEN: Camilo Chaidez appears well, alert and oriented x 3, pleasant and cooperative   HEENT: pupils equal, round, and reactive to light; extraocular muscles intact  NECK: supple, no carotid bruits   HEART: regular rhythm, normal S1 and S2, no murmurs, clicks, gallops or rubs   LUNGS: clear to auscultation bilaterally; no wheezes, rales, or rhonchi   ABDOMEN: normal bowel sounds, soft, no tenderness, no distention  EXTREMITIES: peripheral pulses normal; no clubbing, cyanosis, + edema  NEURO: no focal findings   SKIN: normal without suspicious lesions on exposed skin    Medications:      Current Facility-Administered Medications:     aspirin (ECOTRIN LOW STRENGTH) EC tablet 81 mg, 81 mg, Oral, Daily, ÁNGEL Son, 81 mg at 03/27/21 7089    atorvastatin (LIPITOR) tablet 40 mg, 40 mg, Oral, Daily With Dinner, Lisa George PA-C    furosemide (LASIX) injection 40 mg, 40 mg, Intravenous, Once, Jin Morris PA-C    heparin (porcine) subcutaneous injection 5,000 Units, 5,000 Units, Subcutaneous, Q8H [address], Lisa George PA-C, 5,000 Units at 03/27/21 0620    insulin lispro (HumaLOG) 100 units/mL subcutaneous injection 1-5 Units, 1-5 Units, Subcutaneous, HS, ÁNGEL Hilton    insulin lispro (HumaLOG) 100 units/mL subcutaneous injection 1-6 Units, 1-6 Units, Subcutaneous, TID AC **AND** Fingerstick Glucose (POCT), , , TID AC, ÁNGEL Hilton    melatonin tablet 6 mg, 6 mg, Oral, HS, ÁNGEL Hilton, 6 mg at 03/26/21 2240    senna-docusate sodium (SENOKOT S) 8 6-50 mg per tablet 1 tablet, 1 tablet, Oral, BID, ÁNGEL Son, 1 tablet at 03/26/21 2240     Labs & Results:    Results from last 7 days   Lab Units 03/26/21  1408   TROPONIN I ng/mL 0 04*     Results from last 7 days   Lab Units 03/27/21  0531 03/26/21  1836 03/26/21  1408   WBC Thousand/uL 9 10 12 94* 11 10*   HEMOGLOBIN g/dL 13 3 13 8 14 2   HEMATOCRIT % 40 4 42 7 43 6   PLATELETS Thousands/uL 188 212 212     Results from last 7 days   Lab Units 03/27/21  0525   TRIGLYCERIDES mg/dL 91   HDL mg/dL 37*     Results from last 7 days   Lab Units 03/27/21  0525 03/26/21  2355 03/26/21  1837   POTASSIUM mmol/L 5 1 5 3 5 4*   CHLORIDE mmol/L 108 107 107   CO2 mmol/L 23 24 23   BUN mg/dL 42* 46* 47*   CREATININE mg/dL 1 91* 2 05* 2 53*   CALCIUM mg/dL 8 7 8 6 9 0   ALK PHOS U/L 50  --   --    ALT U/L 24  --   --    AST U/L 18  --   --      Results from last 7 days   Lab Units 03/26/21  1408   INR  1 22*     Results from last 7 days   Lab Units 03/27/21  0525 03/26/21  1837 03/26/21  1408   MAGNESIUM mg/dL 2 3 2 3 2 2       Echo:  Pending    EKG personally reviewed by Gracie Eli MD

## 2021-03-27 NOTE — H&P
H&P Exam - Critical Care   May Ramírez 80 y o  male MRN: [id number]  [facility] Encounter: [id number]      -------------------------------------------------------------------------------------------------------------  Chief Complaint: "I feel a lot better now  I can actually breathe"     History of Present Illness     May Ramírez is a 80 y o  male with known severe CAD s/p remote CABG and PCI and persistent native vessel disease, HFpEF (66%), HTN, HLD, and TIA who had sudden onset of exercise intolerance and shortness of breath approximately 2 weeks ago  He states that he can typically ambulate without complication, but noted a sudden change in which he would become short of breath, dizzy, and occasionally nauseous  He states that it improved with rest, but has been getting progressively worse over the last few weeks to the point where he went to the ED at [address] [facility] and was found to have a heart rate in the 20's  He was externally paced and started on dopamine  He was transferred to [facility] for EP evaluation and went this afternoon for a transjugular temp-perm pacemaker  During the procedure, the patient had two VF episodes with 200J and 260J shocks and brief CPR       History obtained from chart review and the patient   -------------------------------------------------------------------------------------------------------------  Assessment and Plan:    Neuro:    No acute issues   o Sleep/wake cycle regulation  - Melatonin 6mg qHS  o Avoid benzos or other medications known to cause delirium in patients with advanced age   o CAM-ICU BID      CV:    Complete heart block  o Likely related to ischemic heart disease  o Dopamine now off  o POD #0 s/p temp-perm pacemaker  o EP following appreciate recommendations  o Continues to fully pace at 60 bpm  o Hold all AV ruth blocking agents   o Likely plan for dual chamber PPM vs ICD depending on degree of HF seen on echocardiogram   - Likely Monday   o Check TSH in AM for completeness   o Optimize electrolytes, K > 4 0, mag > 2 0   CAD s/p CABG (1982 [id number]; D1, D2, OM2, LAD, OM3 and 2001 SVG to OM3 stent)   o Cardiac catheterization in November 2018 revealed severe native 3-vessel CVAD with patent grafts, EF 66%  o ASA 81mg daily   o Atorvastatin 40mg daily  o Continue to closely monitor telemetry   o Echocardiogram ordered and pending    Hypertension    o Currently hemodynamically WNL  o Maintain MAP > 65  o Maintain SBP < 180  o Holding home antihypertensives at this time:  - Benzapril 10mg daily  - Lasix 20mgm daily   - Imdur 90mg daily   o VS per ICU protocol    Hyperlipidemia   o Check Lipid panel in AM  o Atorvastatin 40mg qHS   - In place of home rosuvastatin 20mg dialy       Pulm:   No acute issues   o On 2L NC, with normal SpO2  o Wean to maintain SpO2 > 90%  o No evidence of consolidation seen on CXR   o  Continue pulmonary hygiene  Incentive spirometer q1h while awake, encourage coughing and deep breathing   Upright positioning    GI:    No acute issues   o Check LFT's in AM   Stress ulcer prophylaxis  o Not indicated    Bowel regimen:    o Senna/colace BID     :    Acute kidney injury   o Likely secondary to ATN from low HR and likely hypotension in the presence of ACE-I and lasix as outpatient   o Baseline creatinine: 0 94 - 1 06  o Admission creatinine: 1 99  o Current creatinine: 2 53  o 500ml IVF now  o Repeat BMP later tonight   o Strict q4h I/O monitoring  o Hold home ACE inhibitor and diuretic at this time   o Maintain adequate renal perfusion pressure   o Continue to follow renal function tests    F/E/N:    Fluids  o Maintenance fluids: None   o Diuresis plan: Holding    Electrolytes  o Replete electrolytes with as needed to maintain K >4 0, Mag >2 0, Phos >3 0   Nutrition  o Cardiac diet as tolerated     Heme/Onc:    No acute issues   o Hemoglobin and platelets WNL  o Transfuse for hemoglobin <8 0 given severe cardiac ischemic disease    VTE prophylaxis:  SQH TID, SCD's to BLE    Endo:    Hyperglycemia    o No known history of diabetes  o Check hemoglobin A1c in AM  o Start AC/HS accuchecks and sliding scale coverage  o Change to carb control diet as needed  o Adjust insulin regimen as needed to maintain goal -180    ID:    Leukocytosis   o Likely stress reaction   o History not consistent with acute infection  o Low suspicion for Lyme   o Continue to monitor fever and WBC curve off antibiotics     MSK/Skin:    Gout  o Holding allopurinol for now due to JAMIE   Exercise intolerance  o Likely due to bradycardia  o PT/OT   o Reposition q2h, eliminate pressure points while in bed  o Close skin surveillance       Disposition: Admit to Critical Care   Code Status: Level 1 - Full Code  --------------------------------------------------------------------------------------------------------------  Review of Systems   Constitutional: Negative  HENT: Positive for hearing loss  Negative for trouble swallowing  Eyes: Negative  Negative for photophobia  Respiratory: Negative  Negative for cough, choking, shortness of breath and wheezing  Cardiovascular: Negative  Negative for chest pain, palpitations and leg swelling  Gastrointestinal: Negative  Negative for abdominal pain, constipation, diarrhea, nausea and vomiting  Endocrine: Negative  Genitourinary: Negative  Musculoskeletal: Negative  Skin: Negative  Allergic/Immunologic: Negative  Neurological: Negative  Negative for dizziness, tremors, syncope, light-headedness, numbness and headaches  Hematological: Negative  Psychiatric/Behavioral: Negative          A 12-point, complete review of systems was reviewed and negative except as stated above     Physical Exam  --------------------------------------------------------------------------------------------------------------  Vitals:   Vitals:    03/26/21 1815 03/26/21 1830 03/26/21 1845 03/26/21 1900   BP: (!) 97/45 112/50 104/50 112/54   Pulse: 60 60 60 60   Resp: 22 20 (!) 24 (!) 25   SpO2: 96% 96% 96% 97%     Temp  Min: 98 6 °F (37 °C)  Max: 98 6 °F (37 °C)        There is no height or weight on file to calculate BMI  Laboratory and Diagnostics:  Results from last 7 days   Lab Units 03/26/21  1836 03/26/21  1408   WBC Thousand/uL 12 94* 11 10*   HEMOGLOBIN g/dL 13 8 14 2   HEMATOCRIT % 42 7 43 6   PLATELETS Thousands/uL 212 212   NEUTROS PCT % 84* 64   MONOS PCT % 8 13*     Results from last 7 days   Lab Units 03/26/21  1837 03/26/21  1408   SODIUM mmol/L 138 136   POTASSIUM mmol/L 5 4* 5 0   CHLORIDE mmol/L 107 102   CO2 mmol/L 23 24   ANION GAP mmol/L 8 10   BUN mg/dL 47* 45*   CREATININE mg/dL 2 53* 1 99*   CALCIUM mg/dL 9 0 9 2   GLUCOSE RANDOM mg/dL 152* 119*     Results from last 7 days   Lab Units 03/26/21  1837 03/26/21  1408   MAGNESIUM mg/dL 2 3 2 2   PHOSPHORUS mg/dL 3 8 3 6      Results from last 7 days   Lab Units 03/26/21  1408   INR  1 22*      Results from last 7 days   Lab Units 03/26/21  1408   TROPONIN I ng/mL 0 04*       EKG: V-paced 60 bpm  Imaging: CXR: No focal consolidation  Pacing wire in ventrivle   I have personally reviewed pertinent reports  and I have personally reviewed pertinent films in PACS    Historical Information   Past Medical History:   Diagnosis Date    Heart disease     Hypertension      History reviewed  No pertinent surgical history  Social History   Social History     Substance and Sexual Activity   Alcohol Use Never    Frequency: Never     Social History     Substance and Sexual Activity   Drug Use Never     Social History     Tobacco Use   Smoking Status Never Smoker   Smokeless Tobacco Never Used     Exercise History: Independent ADL  Family History:   History reviewed  No pertinent family history    Family history unknown      Medications:  Current Facility-Administered Medications   Medication Dose Route Frequency    [START ON 3/27/2021] aspirin (ECOTRIN LOW STRENGTH) EC tablet 81 mg  81 mg Oral Daily    [START ON 3/27/2021] atorvastatin (LIPITOR) tablet 40 mg  40 mg Oral Daily With Dinner    heparin (porcine) subcutaneous injection 5,000 Units  5,000 Units Subcutaneous Q8H [facility]    insulin lispro (HumaLOG) 100 units/mL subcutaneous injection 1-5 Units  1-5 Units Subcutaneous HS    [START ON 3/27/2021] insulin lispro (HumaLOG) 100 units/mL subcutaneous injection 1-6 Units  1-6 Units Subcutaneous TID      Home medications:  Prior to Admission Medications   Prescriptions Last Dose Informant Patient Reported? Taking?   allopurinol (ZYLOPRIM) 300 mg tablet   Yes No   Sig: Take 300 mg by mouth daily   aspirin 325 mg tablet   Yes No   Sig: Take 1 tablet by mouth daily   benazepril (LOTENSIN) 10 mg tablet   Yes No   Sig: Take 10 mg by mouth   furosemide (LASIX) 20 mg tablet   Yes No   Sig: Take 20 mg by mouth daily   isosorbide mononitrate (IMDUR) 60 mg 24 hr tablet   Yes No   Sig: Take 90 mg by mouth daily   latanoprost (XALATAN) 0 005 % ophthalmic solution   Yes No   Sig: Apply 1 drop to eye   multivitamin (THERAGRAN) TABS   Yes No   Sig: Take 1 tablet by mouth daily   rosuvastatin (CRESTOR) 20 MG tablet   Yes No   Sig: Take 20 mg by mouth daily      Facility-Administered Medications: None     Allergies:  No Known Allergies  ------------------------------------------------------------------------------------------------------------  Anticipated Length of Stay is > 2 midnights    Care Time Delivered:   No Critical Care time spent       Doc ÁNGEL Jerome        Portions of the record may have been created with voice recognition software  Occasional wrong word or "sound a like" substitutions may have occurred due to the inherent limitations of voice recognition software    Read the chart carefully and recognize, using context, where substitutions have occurred

## 2021-03-27 NOTE — UTILIZATION REVIEW
Initial Clinical Review    Admission: Date/Time/Statement:   Admission Orders (From admission, onward)     Ordered        03/26/21 1627  Inpatient Admission  Once             Orders Placed This Encounter   Procedures    Inpatient Admission     Standing Status:   Standing     Number of Occurrences:   1     Order Specific Question:   Level of Care     Answer:   Critical Care [15]     Order Specific Question:   Estimated length of stay     Answer:   More than 2 Midnights     Order Specific Question:   Certification     Answer:   I certify that inpatient services are medically necessary for this patient for a duration of greater than two midnights  See H&P and MD Progress Notes for additional information about the patient's course of treatment  ED Arrival Information     Patient not seen in ED-- tx from [address] [place] [address] ED                     Chief complaint: sob    Assessment/Plan: 80 y o  male with known severe CAD s/p remote CABG and PCI,, HFpEF (66%), HTN, HLD, and TIA who presents to [facility] as a transfer from [facility] ED where he initially presented with c/o sudden onset of exercise intolerance and sob ~2 weeks ago  He states that he can typically ambulate without complication, but noted a sudden change in which he would become sob, dizzy, and occasionally nauseous  He states that it improved with rest, but has been getting progressively worse over the last few weeks & presented to the ED where he was found to have a heart rate in the 20's  He was externally paced and started on dopamine  Transferred to [facility] for EP eval and went this afternoon for a transjugular temp-perm pacemaker  During the procedure, the patient had two VF episodes with 200J and 260J shocks and brief CPR  Admit to critical care unit with Complete heart block s/p TVP  Dopamine not off  Continues to fully pace at 60 bpm  Hold all AV ruth blocking agents   Plan for dual chamber PPM vs ICD depending on degree of HF seen on echo  (possibly Monday 3/29)  Optimize electrolytes, K > 4 0, mag > 2 0  Maintain MAP >65, SBP <180  Continue po antihypertensives  Lipid panel in AM   Cardiac diet  Cardiology consult 3/27 --   1  Complete heart block: With wide ventricular escape  Status post temporary screw-in pacemaker  Will need echocardiogram to assess LV function and determine whether patient would benefit from CRT and/or ICD  2  CAD:  With history of remote CABG  Catheterization in November 2018 revealed a patent LIMA and free radial arterial graft off mid LIMA to distal LAD, D1, OM1, and mid right PDA  Continued on medical management with aspirin, statin  No beta-blocker currently due to complete heart block  3  Mild volume overload:  Ejection fraction noted to be normal in 2018  Will need re-evaluation with echocardiogram this admission  Agree with intermittent Lasix dosing as needed  4  Hypertension:  Well controlled  Will eventually need beta-blocker once pacemaker is implanted  5  Hyperlipidemia:  Continue on statin  6  Acute kidney injury:  Unclear baseline for creatinine  Seems to be improving with improved perfusion with pacing  7  Non MI related troponin elevation:  Very borderline, likely secondary to complete heart block and reduced perfusion as a result    For now, can hold off on ischemic evaluation other than with echocardiogram          ED Triage Vitals   Temperature Pulse Respirations Blood Pressure SpO2   03/26/21 2100 03/26/21 1815 03/26/21 1815 03/26/21 1815 03/26/21 1815   98 7 °F (37 1 °C) 60 22 (!) 97/45 96 %      Temp Source Heart Rate Source Patient Position - Orthostatic VS BP Location FiO2 (%)   03/26/21 2100 -- -- -- --   Oral          Pain Score       03/26/21 1708       No Pain          Wt Readings from Last 1 Encounters:   03/26/21 127 kg (279 lb 15 8 oz)     Additional Vital Signs:   Date/Time  Temp  Pulse  Resp  BP  MAP (mmHg)  SpO2  O2 Device   03/27/21 1100  98 1 °F (36 7 °C)  --  --  -- --  --  --   03/27/21 1038  --  60  31Abnormal   127/54  83  94 %  --   03/27/21 1000  --  60  22  --  --  95 %  --   03/27/21 0900  --  60  15  --  --  94 %  --   03/27/21 0800  --  66  23Abnormal   --  --  96 %  None (Room air)   03/27/21 0733  98 6 °F (37 °C)  --  --  --  --  --  --   03/27/21 0700  --  60  20  115/60  97  94 %  --   03/27/21 0600  97 9 °F (36 6 °C)  60  22  119/54  78  95 %  --   03/27/21 0500  --  60  23Abnormal   114/51  72  96 %  --   03/27/21 0400  --  60  12  114/52  79  96 %  --   03/27/21 0300  --  60  29Abnormal   113/57  88  96 %  --   03/27/21 0200  --  60  20  109/52  65  95 %  --   03/27/21 0100  --  60  19  110/49Abnormal   67  95 %  --   03/27/21 0000  --  60  21  104/49Abnormal   72  94 %  --   03/26/21 2300  --  60  12  113/59  92  95 %  --   03/26/21 2200  --  60  24Abnormal   115/54  79  95 %  --   03/26/21 2100  98 7 °F (37 1 °C)  60  25Abnormal   119/52  75  94 %  --   03/26/21 1900  --  60  25Abnormal   112/54  87  97 %  --   03/26/21 1845  --  60  24Abnormal   104/50  71  96 %  --   03/26/21 1830  --  60  20  112/50  78  96 %  --   03/26/21 1815  --  60  22  97/45Abnormal   63  96 %  --       Pertinent Labs/Diagnostic Test Results:   EKG 3/26 -- Sinus rhythm with complete heart block  Right bundle branch block  Possible Lateral infarct , age undetermined  Inferior infarct , age undetermined    CXR 3/27 -- No acute cardiopulmonary disease       Results from last 7 days   Lab Units 03/26/21  1413   SARS-COV-2  Negative     Results from last 7 days   Lab Units 03/27/21  0531 03/26/21  1836 03/26/21  1408   WBC Thousand/uL 9 10 12 94* 11 10*   HEMOGLOBIN g/dL 13 3 13 8 14 2   HEMATOCRIT % 40 4 42 7 43 6   PLATELETS Thousands/uL 188 212 212   NEUTROS ABS Thousands/µL 6 54 10 90* 7 10*     Results from last 7 days   Lab Units 03/27/21  0531 03/27/21  0525 03/26/21  2355 03/26/21  1837 03/26/21  1836 03/26/21  1408   SODIUM mmol/L  --  139 138 138  --  136   POTASSIUM mmol/L  -- 5 1 5 3 5 4*  --  5 0   CHLORIDE mmol/L  --  108 107 107  --  102   CO2 mmol/L  --  23 24 23  --  24   ANION GAP mmol/L  --  8 7 8  --  10   BUN mg/dL  --  42* 46* 47*  --  45*   CREATININE mg/dL  --  1 91* 2 05* 2 53*  --  1 99*   EGFR ml/min/1 73sq m  --  31 28 22  --  30   CALCIUM mg/dL  --  8 7 8 6 9 0  --  9 2   CALCIUM, IONIZED mmol/L 1 16  --   --   --  1 15  --    MAGNESIUM mg/dL  --  2 3  --  2 3  --  2 2   PHOSPHORUS mg/dL  --  4 1  --  3 8  --  3 6     Results from last 7 days   Lab Units 03/27/21  0525   AST U/L 18   ALT U/L 24   ALK PHOS U/L 50   TOTAL PROTEIN g/dL 6 3*   ALBUMIN g/dL 3 2*   TOTAL BILIRUBIN mg/dL 0 98   BILIRUBIN DIRECT mg/dL 0 33*     Results from last 7 days   Lab Units 03/27/21  1045 03/27/21  0731 03/26/21  2240   POC GLUCOSE mg/dl 114 94 116     Results from last 7 days   Lab Units 03/27/21  0525 03/26/21  2355 03/26/21  1837 03/26/21  1408   GLUCOSE RANDOM mg/dL 88 106 152* 119*     Results from last 7 days   Lab Units 03/27/21  0531   HEMOGLOBIN A1C % 5 8*   EAG mg/dl 120     Results from last 7 days   Lab Units 03/26/21  1408   TROPONIN I ng/mL 0 04*     Results from last 7 days   Lab Units 03/26/21  1408   PROTIME seconds 15 3*   INR  1 22*     Results from last 7 days   Lab Units 03/27/21  0525   TSH 3RD GENERATON uIU/mL 2 620     Results from last 7 days   Lab Units 03/26/21  1408   BNP pg/mL 686*     Results from last 7 days   Lab Units 03/26/21  1413   INFLUENZA A PCR  Negative   INFLUENZA B PCR  Negative   RSV PCR  Negative       Past Medical History:   Diagnosis Date    Heart disease     Hypertension      Present on Admission:   Complete heart block (HCC)   Hypertension   Hyperlipidemia   Acute kidney failure (HCC)   Hyperglycemia   (Resolved) Leukocytosis   Gout   Coronary artery disease of bypass graft of native heart with stable angina pectoris (HCC)      Admitting Diagnosis: Heart block [I45 9]  Age/Sex: 80 y o  male  Admission Orders:  Scheduled Medications:  aspirin, 81 mg, Oral, Daily  atorvastatin, 40 mg, Oral, Daily With Dinner  heparin (porcine), 5,000 Units, Subcutaneous, Q8H [address]  insulin lispro, 1-5 Units, Subcutaneous, HS  insulin lispro, 1-6 Units, Subcutaneous, TID AC  melatonin, 6 mg, Oral, HS  senna-docusate sodium, 1 tablet, Oral, BID      Network Utilization Review Department  ATTENTION: Please call with any questions or concerns to [phone] and carefully listen to the prompts so that you are directed to the right person  All voicemails are confidential   Janina Lie all requests for admission clinical reviews, approved or denied determinations and any other requests to dedicated fax number below belonging to the campus where the patient is receiving treatment   List of dedicated fax numbers for the Facilities:  [address] DENIALS (Administrative/Medical Necessity) [phone]   [address] (Maternity/NICU/Pediatrics) [phone]   [address] Audrey Nguyen 0121 (  Josefina Coffman [facility]) [address] [place] [address] Rodney Jeffery [address] [place] [address] [phone]

## 2021-03-27 NOTE — PLAN OF CARE
Problem: Potential for Falls  Goal: Patient will remain free of falls  Description: INTERVENTIONS:  - Assess patient frequently for physical needs  -  Identify cognitive and physical deficits and behaviors that affect risk of falls    -  [place] fall precautions as indicated by assessment   - Educate patient/family on patient safety including physical limitations  - Instruct patient to call for assistance with activity based on assessment  - Modify environment to reduce risk of injury  - Consider OT/PT consult to assist with strengthening/mobility  Outcome: Progressing     Problem: Prexisting or High Potential for Compromised Skin Integrity  Goal: Skin integrity is maintained or improved  Description: INTERVENTIONS:  - Identify patients at risk for skin breakdown  - Assess and monitor skin integrity  - Assess and monitor nutrition and hydration status  - Monitor labs   - Assess for incontinence   - Turn and reposition patient  - Assist with mobility/ambulation  - Relieve pressure over bony prominences  - Avoid friction and shearing  - Provide appropriate hygiene as needed including keeping skin clean and dry  - Evaluate need for skin moisturizer/barrier cream  - Collaborate with interdisciplinary team   - Patient/family teaching  - Consider wound care consult   Outcome: Progressing

## 2021-03-27 NOTE — PROGRESS NOTES
Daily Progress Note - Critical Care   Lennox Chamberlain 80 y o  male MRN: [id number]  Unit/Bed#: PPHP 513-01 Encounter: [id number]        ----------------------------------------------------------------------------------------  HPI/24hr events:   · Remained V-paced overnight  · Poor sleep  ---------------------------------------------------------------------------------------  SUBJECTIVE  "I can't sleep"     Review of Systems   Respiratory: Negative for shortness of breath  Cardiovascular: Negative for chest pain  Gastrointestinal: Negative for nausea  Neurological: Negative for dizziness and light-headedness  Review of systems was reviewed and negative unless stated above in HPI/24-hour events   ---------------------------------------------------------------------------------------  Assessment and Plan  Principal Problem:    Complete heart block (HCC)  Active Problems:    Hypertension    Hyperlipidemia    Acute kidney failure (HCC)    Hyperglycemia    Leukocytosis    Gout    Exercise intolerance  Resolved Problems:    * No resolved hospital problems  *    Neuro:   · No acute issues   ? Sleep/wake cycle regulation  § Melatonin 6mg qHS  ? Avoid benzos or other medications known to cause delirium in patients with advanced age   ? CAM-ICU BID  ? Routine neuro checks         CV:   · Complete heart block  ? Likely related to ischemic heart disease  ? Remains off dopamine   ? POD #1 s/p temp-perm pacemaker  ? EP following appreciate recommendations  ? Continues to fully pace at 60 bpm  ? Hold all AV ruth blocking agents    ? Likely plan for dual chamber PPM vs ICD on Monday depending on degree of HF seen on echocardiogram   ? TSH WNL   ? Optimize electrolytes, K > 4 0, mag > 2 0  · CAD s/p CABG (1982 [id number]; D1, D2, OM2, LAD, OM3 and 2001 SVG to OM3 stent)   ? Cardiac catheterization in November 2018 revealed severe native 3-vessel CVAD with patent grafts, EF 66%  ? ASA 81mg daily   ?  Atorvastatin 40mg daily  ? Continue to closely monitor telemetry   ? Echocardiogram ordered and pending   § Follow up today   · Hypertension    ? Currently hemodynamically WNL  ? Maintain MAP > 65  ? Maintain SBP < 180  ? Holding home antihypertensives at this time:  § Benzapril 10mg daily  § Lasix 20mgm daily   § Imdur 90mg daily   ? VS per ICU protocol   · Hyperlipidemia              ? 3/27/21 Lipid panel:   § Total cholesterol: 80  § Triglyceride: 91  § LDL: 25  § HDL: 37  ? Atorvastatin 40mg qHS   § In place of home rosuvastatin 20mg dialy         Pulm:  · No acute issues   ? Wean supplemental O2 maintain SpO2 > 90%  ? No evidence of consolidation seen on CXR   ? Continue pulmonary hygiene  Incentive spirometer q1h while awake, encourage coughing and deep breathing  Upright positioning     GI:   · No acute issues   ? LFT's WNL  · Stress ulcer prophylaxis  ? Not indicated   · Bowel regimen:    ? Senna/colace BID   ? Last BM: Prior to admission      :   · Acute kidney injury   ? Likely secondary to ATN from low HR and likely hypotension in the presence of ACE-I and lasix as outpatient   ? Baseline creatinine: 0 94 - 1 06  ? Admission creatinine: 1 99  ? Current creatinine: 1 91  ? S/p 500ml IVF overnight   ? Strict q4h I/O monitoring  ? Continue to hold home ACE inhibitor and diuretic at this time   ? Maintain adequate renal perfusion pressure   ? Continue to follow renal function tests     F/E/N:   · Fluids  ? Maintenance fluids: None   ? Diuresis plan: Holding   · Electrolytes  ? Replete electrolytes with as needed to maintain K >4 0, Mag >2 0, Phos >3 0  · Nutrition  ? Cardiac diet as tolerated      Heme/Onc:   · No acute issues   ? Hemoglobin and platelets WNL  ? Transfuse for hemoglobin <8 0 given severe cardiac ischemic disease   · VTE prophylaxis:  SQH TID, SCD's to BLE     Endo:   · Hyperglycemia    ? No known history of diabetes  ? Hemoglobin A1c pending  ? AC/HS glucose checks and sliding scale coverage  ?  Change to carb control diet as needed  ? Adjust insulin regimen as needed to maintain goal -180         ID:   · Leukocytosis- Resolved   ? Likely stress reaction   ? History not consistent with acute infection  ? Low suspicion for Lyme   ? Continue to monitor fever and WBC curve off antibiotics      MSK/Skin:   · Gout  ? Holding allopurinol for now due to JAMIE  · Exercise intolerance  ? Likely due to bradycardia  ? PT/OT   ? Reposition q2h, eliminate pressure points while in bed  ? Close skin surveillance     Disposition: Continue Critical Care   Code Status: Level 1 - Full Code  ---------------------------------------------------------------------------------------  Invasive Devices Review  Invasive Devices     Peripheral Intravenous Line            Peripheral IV 21 Left Antecubital less than 1 day    Peripheral IV 21 Right Wrist less than 1 day              Can any invasive devices be discontinued today? No  ---------------------------------------------------------------------------------------  OBJECTIVE    Vitals   Vitals:    21 1830 21 1845 21 1900 21 2300   BP: 112/50 104/50 112/54 113/59   Pulse: 60 60 60 60   Resp: 20 (!) 24 (!) 25 12   SpO2: 96% 96% 97% 95%     Temp (24hrs), Av 6 °F (37 °C), Min:98 6 °F (37 °C), Max:98 6 °F (37 °C)  Current:      Physical Exam  Vitals signs reviewed  Constitutional:       Appearance: Normal appearance  Interventions: Nasal cannula in place  HENT:      Head: Normocephalic and atraumatic  Nose:        Mouth/Throat:      Lips: Pink  Mouth: Mucous membranes are dry  Eyes:      Conjunctiva/sclera: Conjunctivae normal       Pupils: Pupils are equal, round, and reactive to light  Neck:      Musculoskeletal: Full passive range of motion without pain  Cardiovascular:      Rate and Rhythm: Normal rate and regular rhythm  No extrasystoles are present       Pulses:           Radial pulses are 2+ on the right side and 2+ on the left side  Dorsalis pedis pulses are 2+ on the right side and 2+ on the left side  Heart sounds: Normal heart sounds  Comments: V-paced 60 bpm  Pulmonary:      Effort: Pulmonary effort is normal       Breath sounds: Examination of the right-lower field reveals decreased breath sounds  Examination of the left-lower field reveals decreased breath sounds  Decreased breath sounds present  No wheezing, rhonchi or rales  Abdominal:      General: Abdomen is protuberant  Bowel sounds are decreased  Palpations: Abdomen is soft  Tenderness: There is no abdominal tenderness  Musculoskeletal:      Right lower leg: No edema  Left lower leg: No edema  Skin:     General: Skin is warm and dry  Capillary Refill: Capillary refill takes less than 2 seconds  Neurological:      General: No focal deficit present  Mental Status: He is alert and oriented to person, place, and time  GCS: GCS eye subscore is 4  GCS verbal subscore is 5  GCS motor subscore is 6  Motor: Motor function is intact  Psychiatric:         Behavior: Behavior is cooperative             Laboratory and Diagnostics:  Results from last 7 days   Lab Units 03/26/21 1836 03/26/21  1408   WBC Thousand/uL 12 94* 11 10*   HEMOGLOBIN g/dL 13 8 14 2   HEMATOCRIT % 42 7 43 6   PLATELETS Thousands/uL 212 212   NEUTROS PCT % 84* 64   MONOS PCT % 8 13*     Results from last 7 days   Lab Units 03/26/21  2355 03/26/21 1837 03/26/21  1408   SODIUM mmol/L 138 138 136   POTASSIUM mmol/L 5 3 5 4* 5 0   CHLORIDE mmol/L 107 107 102   CO2 mmol/L 24 23 24   ANION GAP mmol/L 7 8 10   BUN mg/dL 46* 47* 45*   CREATININE mg/dL 2 05* 2 53* 1 99*   CALCIUM mg/dL 8 6 9 0 9 2   GLUCOSE RANDOM mg/dL 106 152* 119*     Results from last 7 days   Lab Units 03/26/21  1837 03/26/21  1408   MAGNESIUM mg/dL 2 3 2 2   PHOSPHORUS mg/dL 3 8 3 6      Results from last 7 days   Lab Units 03/26/21  1408   INR  1 22*      Results from last 7 days Lab Units 03/26/21  1408   TROPONIN I ng/mL 0 04*       EKG: V-paced    Imaging: No new imaging  I have personally reviewed pertinent reports  and I have personally reviewed pertinent films in PACS    Intake and Output  I/O       03/25 0701 - 03/26 0700 03/26 0701 - 03/27 0700    I  V   500    Total Intake  500    Urine  950    Total Output  950    Net  -450                  Height and Weights         There is no height or weight on file to calculate BMI  Weight (last 2 days)     None            Nutrition       Diet Orders   (From admission, onward)             Start     Ordered    03/26/21 2000  Diet Cardiovascular; Cardiac  Diet effective now     Question Answer Comment   Diet Type Cardiovascular    Cardiac Cardiac    RD to adjust diet per protocol? Yes        03/26/21 2000                    Active Medications  Scheduled Meds:  Current Facility-Administered Medications   Medication Dose Route Frequency Provider Last Rate    aspirin  81 mg Oral Daily Janeane Cables, ÁNGEL      atorvastatin  40 mg Oral Daily With Leonid Vasquez PA-C      heparin (porcine)  5,000 Units Subcutaneous Count includes the [facility] Vickey Barreto PA-C      insulin lispro  1-5 Units Subcutaneous HS Janeane CablesÁNGEL      insulin lispro  1-6 Units Subcutaneous TID AC ÁNGEL Hilton      melatonin  6 mg Oral HS Janeane Cables, ÁNGEL      senna-docusate sodium  1 tablet Oral BID Omari ÁNGEL [place]       Continuous Infusions:     PRN Meds:        Allergies   No Known Allergies  ---------------------------------------------------------------------------------------  Advance Directive and Living Will:      Power of :    POLST:    ---------------------------------------------------------------------------------------  Care Time Delivered:   No Critical Care time spent       ÁNGEL Mondragon        Portions of the record may have been created with voice recognition software  Occasional wrong word or "sound a like" substitutions may have occurred due to the inherent limitations of voice recognition software    Read the chart carefully and recognize, using context, where substitutions have occurred

## 2021-03-28 PROBLEM — R73.03 PREDIABETES: Chronic | Status: ACTIVE | Noted: 2021-03-26

## 2021-03-28 PROBLEM — R73.03 PREDIABETES: Status: ACTIVE | Noted: 2021-03-26

## 2021-03-28 LAB
ANION GAP SERPL CALCULATED.3IONS-SCNC: 4 MMOL/L (ref 4–13)
BASOPHILS # BLD AUTO: 0.06 THOUSANDS/ΜL (ref 0–0.1)
BASOPHILS NFR BLD AUTO: 1 % (ref 0–1)
BUN SERPL-MCNC: 36 MG/DL (ref 5–25)
CALCIUM SERPL-MCNC: 8.8 MG/DL (ref 8.3–10.1)
CHLORIDE SERPL-SCNC: 110 MMOL/L (ref 100–108)
CO2 SERPL-SCNC: 26 MMOL/L (ref 21–32)
CREAT SERPL-MCNC: 1.46 MG/DL (ref 0.6–1.3)
EOSINOPHIL # BLD AUTO: 0.27 THOUSAND/ΜL (ref 0–0.61)
EOSINOPHIL NFR BLD AUTO: 3 % (ref 0–6)
ERYTHROCYTE [DISTWIDTH] IN BLOOD BY AUTOMATED COUNT: 13.3 % (ref 11.6–15.1)
GFR SERPL CREATININE-BSD FRML MDRD: 43 ML/MIN/1.73SQ M
GLUCOSE SERPL-MCNC: 102 MG/DL (ref 65–140)
GLUCOSE SERPL-MCNC: 104 MG/DL (ref 65–140)
GLUCOSE SERPL-MCNC: 105 MG/DL (ref 65–140)
GLUCOSE SERPL-MCNC: 89 MG/DL (ref 65–140)
GLUCOSE SERPL-MCNC: 92 MG/DL (ref 65–140)
HCT VFR BLD AUTO: 42.7 % (ref 36.5–49.3)
HGB BLD-MCNC: 13.9 G/DL (ref 12–17)
IMM GRANULOCYTES # BLD AUTO: 0.04 THOUSAND/UL (ref 0–0.2)
IMM GRANULOCYTES NFR BLD AUTO: 0 % (ref 0–2)
LYMPHOCYTES # BLD AUTO: 1.82 THOUSANDS/ΜL (ref 0.6–4.47)
LYMPHOCYTES NFR BLD AUTO: 19 % (ref 14–44)
MAGNESIUM SERPL-MCNC: 2.3 MG/DL (ref 1.6–2.6)
MCH RBC QN AUTO: 33.5 PG (ref 26.8–34.3)
MCHC RBC AUTO-ENTMCNC: 32.6 G/DL (ref 31.4–37.4)
MCV RBC AUTO: 103 FL (ref 82–98)
MONOCYTES # BLD AUTO: 1.07 THOUSAND/ΜL (ref 0.17–1.22)
MONOCYTES NFR BLD AUTO: 11 % (ref 4–12)
NEUTROPHILS # BLD AUTO: 6.6 THOUSANDS/ΜL (ref 1.85–7.62)
NEUTS SEG NFR BLD AUTO: 66 % (ref 43–75)
NRBC BLD AUTO-RTO: 0 /100 WBCS
PLATELET # BLD AUTO: 172 THOUSANDS/UL (ref 149–390)
PMV BLD AUTO: 10.9 FL (ref 8.9–12.7)
POTASSIUM SERPL-SCNC: 4.4 MMOL/L (ref 3.5–5.3)
RBC # BLD AUTO: 4.15 MILLION/UL (ref 3.88–5.62)
SODIUM SERPL-SCNC: 140 MMOL/L (ref 136–145)
WBC # BLD AUTO: 9.86 THOUSAND/UL (ref 4.31–10.16)

## 2021-03-28 PROCEDURE — 82948 REAGENT STRIP/BLOOD GLUCOSE: CPT

## 2021-03-28 PROCEDURE — 99232 SBSQ HOSP IP/OBS MODERATE 35: CPT | Performed by: INTERNAL MEDICINE

## 2021-03-28 PROCEDURE — 85025 COMPLETE CBC W/AUTO DIFF WBC: CPT | Performed by: PHYSICIAN ASSISTANT

## 2021-03-28 PROCEDURE — 99233 SBSQ HOSP IP/OBS HIGH 50: CPT | Performed by: ANESTHESIOLOGY

## 2021-03-28 PROCEDURE — 83735 ASSAY OF MAGNESIUM: CPT | Performed by: PHYSICIAN ASSISTANT

## 2021-03-28 PROCEDURE — 80048 BASIC METABOLIC PNL TOTAL CA: CPT | Performed by: PHYSICIAN ASSISTANT

## 2021-03-28 RX ORDER — FUROSEMIDE 10 MG/ML
40 INJECTION INTRAMUSCULAR; INTRAVENOUS ONCE
Status: COMPLETED | OUTPATIENT
Start: 2021-03-28 | End: 2021-03-28

## 2021-03-28 RX ORDER — CHLORHEXIDINE GLUCONATE 0.12 MG/ML
15 RINSE ORAL ONCE
Status: COMPLETED | OUTPATIENT
Start: 2021-03-28 | End: 2021-03-28

## 2021-03-28 RX ADMIN — Medication 1 CAPSULE: at 21:42

## 2021-03-28 RX ADMIN — ATORVASTATIN CALCIUM 40 MG: 40 TABLET, FILM COATED ORAL at 17:01

## 2021-03-28 RX ADMIN — HEPARIN SODIUM 5000 UNITS: 5000 INJECTION INTRAVENOUS; SUBCUTANEOUS at 21:42

## 2021-03-28 RX ADMIN — HEPARIN SODIUM 5000 UNITS: 5000 INJECTION INTRAVENOUS; SUBCUTANEOUS at 14:22

## 2021-03-28 RX ADMIN — DOCUSATE SODIUM AND SENNOSIDES 1 TABLET: 8.6; 5 TABLET ORAL at 09:35

## 2021-03-28 RX ADMIN — Medication 1 CAPSULE: at 09:35

## 2021-03-28 RX ADMIN — FUROSEMIDE 40 MG: 10 INJECTION, SOLUTION INTRAVENOUS at 10:48

## 2021-03-28 RX ADMIN — CHLORHEXIDINE GLUCONATE 0.12% ORAL RINSE 15 ML: 1.2 LIQUID ORAL at 12:44

## 2021-03-28 RX ADMIN — ASPIRIN 81 MG: 81 TABLET, COATED ORAL at 09:35

## 2021-03-28 RX ADMIN — HEPARIN SODIUM 5000 UNITS: 5000 INJECTION INTRAVENOUS; SUBCUTANEOUS at 05:08

## 2021-03-28 RX ADMIN — MELATONIN TAB 3 MG 6 MG: 3 TAB at 21:42

## 2021-03-28 RX ADMIN — DOCUSATE SODIUM AND SENNOSIDES 1 TABLET: 8.6; 5 TABLET ORAL at 17:01

## 2021-03-28 NOTE — ASSESSMENT & PLAN NOTE
· No known history of diabetes  · Hemoglobin A1c 5 8% on 3/27/21  ?  SSI discontinued as no coverage required   · Change to carb control diet as needed

## 2021-03-28 NOTE — ASSESSMENT & PLAN NOTE
· Likely related to ischemic heart disease  · POD #3 s/p temp-perm pacemaker  · EP following appreciate recommendations  · Continues to fully pace at 60 bpm  · Hold all AV ruth blocking agents    · Likely plan for dual chamber PPM today  ?  EF 60%, no ICD indication   · TSH WNL   · Optimize electrolytes, K > 4 0, mag > 2 0

## 2021-03-28 NOTE — ASSESSMENT & PLAN NOTE
· Likely secondary to ATN from low HR and likely hypotension in the presence of ACE-I and lasix as outpatient   · Baseline creatinine: 0 94 - 1 06  · Admission creatinine: 1 99  · Most recent creatinine: 1 46  · Strict q4h I/O monitoring  · Continue lasix IV as needed to keep euvolemic, no evidence of overload   · Maintain adequate renal perfusion pressure   · Continue to follow renal function tests

## 2021-03-28 NOTE — ASSESSMENT & PLAN NOTE
· Currently hemodynamically WNL  · Maintain MAP > 65  · Maintain SBP < 180  · Resume home Imdur 90mg daily today    · Holding home antihypertensives at this time:  ? Benzapril 10mg daily due to JAMIE

## 2021-03-28 NOTE — ASSESSMENT & PLAN NOTE
· Cardiac catheterization in November 2018 revealed severe native 3-vessel CVAD with patent grafts, EF 66%  · 3/27/21 Echocardiogram: EF 60%, no RWMA, mild MR, mild TR   · ASA 81mg daily   · Atorvastatin 40mg daily  · Continue to closely monitor telemetry

## 2021-03-28 NOTE — ASSESSMENT & PLAN NOTE
· Cardiac catheterization in November 2018 revealed severe native 3-vessel CVAD with patent grafts, EF 66%  · 3/27/21 Echocardiogram: EF 60%, no RWMA, mild MR, mild TR   · ASA 81mg daily   · Atorvastatin 40mg daily  · Continue to closely monitor telemetry   · Cardiology following

## 2021-03-28 NOTE — ASSESSMENT & PLAN NOTE
· Likely secondary to ATN from low HR and likely hypotension in the presence of ACE-I and lasix as outpatient   · Baseline creatinine: 0 94 - 1 06  · Admission creatinine: 1 99  · Most recent creatinine: 1 80  · Strict q4h I/O monitoring  · Continue lasix IV as needed to keep euvolemic, no evidence of overload   · Maintain adequate renal perfusion pressure   · Continue to follow renal function tests

## 2021-03-28 NOTE — ASSESSMENT & PLAN NOTE
· 3/27/21 Lipid panel:           ? Total cholesterol: 80  ? Triglyceride: 91  ? LDL: 25  ? HDL: 37  · Atorvastatin 40mg qHS   ?  In place of home rosuvastatin 20mg daily

## 2021-03-28 NOTE — PROGRESS NOTES
Cardiology Progress Note - Javi Reese 80 y o  male MRN: [id number]    Unit/Bed#: [facility] 509-01 Encounter: [id number]      Assessment/Recommendations:  1  Complete heart block: With wide ventricular escape  Status post temporary screw-in pacemaker  Echocardiogram revealed normal ejection fraction so patient would not benefit from CRT and/or ICD  2  CAD:  With history of remote CABG  Catheterization in November 2018 revealed a patent LIMA and free radial arterial graft off mid LIMA to distal LAD, D1, OM1, and mid right PDA  No wall motion abnormality seen on echocardiogram   Continued on medical management with aspirin, statin  No beta-blocker currently due to complete heart block-can resume post pacemaker implant tomorrow  3  Mild volume overload:  Ejection fraction noted to be normal in 2018, and remains normal on echo this admission  Agree with intermittent Lasix dosing as needed  4  Hypertension:  Well controlled  Will eventually need beta-blocker once pacemaker is implanted  5  Hyperlipidemia:  Continue on statin  6  Acute kidney injury:  Unclear baseline for creatinine  Seems to be improving with improved perfusion with pacing  7  Non MI related troponin elevation:  Very borderline, likely secondary to complete heart block and reduced perfusion as a result  For now, can hold off on ischemic evaluation other than with echocardiogram - which reveals continued ejection fraction that is normal and no wall motion abnormalities  Subjective:   Patient seen and examined  No significant events overnight   ; pertinent negatives - chest pain, chest pressure/discomfort, dyspnea, irregular heart beat, lower extremity edema and palpitations  Objective:     Vitals: Blood pressure 131/57, pulse 60, temperature 98 4 °F (36 9 °C), temperature source Oral, resp  rate 18, weight 117 kg (257 lb 4 4 oz), SpO2 96 %  , Body mass index is 37 99 kg/m² ,   Orthostatic Blood Pressures      Most Recent Value Blood Pressure  131/57 filed at 03/28/2021 0700   Patient Position - Orthostatic VS  Lying filed at 03/28/2021 0324            Intake/Output Summary (Last 24 hours) at 3/28/2021 0956  Last data filed at 3/28/2021 0700  Gross per 24 hour   Intake 690 ml   Output 2400 ml   Net -1710 ml       TELE:  Ventricular pacing    Physical Exam:    GEN: Tia Chaidez appears well, alert and oriented x 3, pleasant and cooperative   HEENT: pupils equal, round, and reactive to light; extraocular muscles intact  NECK: supple, no carotid bruits   HEART: regular rhythm, normal S1 and S2, no murmurs, clicks, gallops or rubs   LUNGS: clear to auscultation bilaterally; no wheezes, rales, or rhonchi   ABDOMEN: normal bowel sounds, soft, no tenderness, no distention  EXTREMITIES: peripheral pulses normal; no clubbing, cyanosis, or edema  NEURO: no focal findings   SKIN: normal without suspicious lesions on exposed skin      Medications:      Current Facility-Administered Medications:     aspirin (ECOTRIN LOW STRENGTH) EC tablet 81 mg, 81 mg, Oral, Daily, Jin Morris PA-C, 81 mg at 03/28/21 0935    atorvastatin (LIPITOR) tablet 40 mg, 40 mg, Oral, Daily With Dinner, Rakel Nj PA-C, 40 mg at 03/27/21 1607    furosemide (LASIX) injection 40 mg, 40 mg, Intravenous, Once, Rakel Nj PA-C    heparin (porcine) subcutaneous injection 5,000 Units, 5,000 Units, Subcutaneous, Q8H [facility], Rakel Nj PA-C, 5,000 Units at 03/28/21 0508    insulin lispro (HumaLOG) 100 units/mL subcutaneous injection 1-5 Units, 1-5 Units, Subcutaneous, HS, Jin Morris PA-C    insulin lispro (HumaLOG) 100 units/mL subcutaneous injection 1-6 Units, 1-6 Units, Subcutaneous, TID AC **AND** Fingerstick Glucose (POCT), , , TID AC, Jin Morris PA-C    melatonin tablet 6 mg, 6 mg, Oral, HS, Rakel Nj PA-C, 6 mg at 03/27/21 2158    PreserVision AREDS 2 CAPS 1 capsule, 1 capsule, Oral, BID, Rossy ÁNGEL Cano, 1 capsule at 03/28/21 0930   senna-docusate sodium (SENOKOT S) 8 6-50 mg per tablet 1 tablet, 1 tablet, Oral, BID, Angel Torres PA-C, 1 tablet at 03/28/21 0935     Labs & Results:    Results from last 7 days   Lab Units 03/26/21  1408   TROPONIN I ng/mL 0 04*     Results from last 7 days   Lab Units 03/28/21  0432 03/27/21  0531 03/26/21  1836   WBC Thousand/uL 9 86 9 10 12 94*   HEMOGLOBIN g/dL 13 9 13 3 13 8   HEMATOCRIT % 42 7 40 4 42 7   PLATELETS Thousands/uL 172 188 212     Results from last 7 days   Lab Units 03/27/21  0525   TRIGLYCERIDES mg/dL 91   HDL mg/dL 37*     Results from last 7 days   Lab Units 03/28/21  0432 03/27/21  1620 03/27/21  0525   POTASSIUM mmol/L 4 4 4 6 5 1   CHLORIDE mmol/L 110* 111* 108   CO2 mmol/L 26 25 23   BUN mg/dL 36* 41* 42*   CREATININE mg/dL 1 46* 1 80* 1 91*   CALCIUM mg/dL 8 8 9 1 8 7   ALK PHOS U/L  --   --  50   ALT U/L  --   --  24   AST U/L  --   --  18     Results from last 7 days   Lab Units 03/26/21  1408   INR  1 22*     Results from last 7 days   Lab Units 03/28/21  0432 03/27/21  0525 03/26/21  1837   MAGNESIUM mg/dL 2 3 2 3 2 3       Echo:  Personally reviewed-normal ejection fraction with mild mitral regurgitation mild tricuspid regurgitation      EKG personally reviewed by Karishma Lauren MD

## 2021-03-28 NOTE — PROGRESS NOTES
[facility]  Progress Note - Dov Solar 1934, 80 y o  male MRN: [id number]  Unit/Bed#: [facility] 509-01 Encounter: [id number]  Primary Care Provider: Chema Vasquez DO   Date and time admitted to hospital: 3/26/2021  4:13 PM    * Complete heart block ([facility] [address] )  Assessment & Plan  · Likely related to ischemic heart disease  · POD #2 s/p temp-perm pacemaker  · EP following appreciate recommendations  · Continues to fully pace at 60 bpm  · Hold all AV ruth blocking agents    · Likely plan for dual chamber PPM on Monday   ? EF 60%, no ICD indication   · TSH WNL   · Optimize electrolytes, K > 4 0, mag > 2 0    JAMIE (acute kidney injury) ([facility] [address] )  Assessment & Plan  · Likely secondary to ATN from low HR and likely hypotension in the presence of ACE-I and lasix as outpatient   · Baseline creatinine: 0 94 - 1 06  · Admission creatinine: 1 99  · Most recent creatinine: 1 46  · Strict q4h I/O monitoring  · Continue lasix IV as needed to keep euvolemic, no evidence of overload   · Maintain adequate renal perfusion pressure   · Continue to follow renal function tests      Coronary artery disease of bypass graft of native heart with stable angina pectoris [facility])  Assessment & Plan  · Cardiac catheterization in November 2018 revealed severe native 3-vessel CVAD with patent grafts, EF 66%  · 3/27/21 Echocardiogram: EF 60%, no RWMA, mild MR, mild TR   · ASA 81mg daily   · Atorvastatin 40mg daily  · Continue to closely monitor telemetry       Hypertension  Assessment & Plan  · Currently hemodynamically WNL  · Maintain MAP > 65  · Maintain SBP < 180  · Resume home Imdur 90mg daily today   · Holding home antihypertensives at this time:  ? Benzapril 10mg daily due to JAMIE   · VS per ICU protocol     Hyperlipidemia  Assessment & Plan  · 3/27/21 Lipid panel:           ? Total cholesterol: 80  ? Triglyceride: 91  ? LDL: 25  ? HDL: 37  · Atorvastatin 40mg qHS   ?  In place of home rosuvastatin 20mg daily Gout  Assessment & Plan  · Holding allopurinol for now due to JAMIE    Prediabetes  Assessment & Plan  · No known history of diabetes  · Hemoglobin A1c 5 8% on 3/27/21  · AC/HS glucose checks and sliding scale coverage  ? Discontinue if no coverage needed today   · Change to carb control diet as needed  · Adjust insulin regimen as needed to maintain goal -180        ----------------------------------------------------------------------------------------  HPI/24hr events:   No acute events  Diuresed after lasix     Disposition: Continue Stepdown Level 1 level of care   Can transfer to [facility] with [facility]   Code Status: Level 1 - Full Code  ---------------------------------------------------------------------------------------  SUBJECTIVE  "I finally got some sleep"     Review of Systems   Constitutional: Positive for fatigue  Respiratory: Negative for shortness of breath  Cardiovascular: Negative for chest pain  Gastrointestinal: Negative for nausea  Neurological: Negative for dizziness and light-headedness  Review of systems was reviewed and negative unless stated above in HPI/24-hour events   ---------------------------------------------------------------------------------------  OBJECTIVE    Vitals   Vitals:    21 1700 21 1849 21 2343 21 0324   BP:  151/59 124/63 125/64   BP Location:  Right arm Left arm Left arm   Pulse: 60 59 59 60   Resp: (!) 24 22 18 18   Temp:  98 2 °F (36 8 °C) 98 7 °F (37 1 °C) 98 1 °F (36 7 °C)   TempSrc:  Oral Oral Oral   SpO2: 94% 95% 95% 93%     Temp (24hrs), Av 2 °F (36 8 °C), Min:97 8 °F (36 6 °C), Max:98 7 °F (37 1 °C)  Current: Temperature: 98 1 °F (36 7 °C)          Respiratory:    Nasal Cannula O2 Flow Rate (L/min): 2 L/min      Physical Exam  Vitals signs reviewed  Constitutional:       General: He is sleeping  Appearance: Normal appearance  Interventions: Nasal cannula in place     HENT:      Head: Normocephalic and atraumatic  Mouth/Throat:      Dentition: Abnormal dentition  Eyes:      Conjunctiva/sclera: Conjunctivae normal       Pupils: Pupils are equal, round, and reactive to light  Cardiovascular:      Rate and Rhythm: Normal rate  Rhythm regularly irregular  No extrasystoles are present  Pulses:           Radial pulses are 2+ on the right side and 2+ on the left side  Dorsalis pedis pulses are 2+ on the right side and 2+ on the left side  Heart sounds: Normal heart sounds  Comments: V-paced  Pulmonary:      Effort: Pulmonary effort is normal       Breath sounds: Examination of the right-lower field reveals decreased breath sounds  Examination of the left-lower field reveals decreased breath sounds  Decreased breath sounds present  No wheezing or rhonchi  Abdominal:      General: Abdomen is flat  Palpations: Abdomen is soft  Tenderness: There is no abdominal tenderness  Genitourinary:     Comments: Clear paige urine in urinal  Musculoskeletal:      Right lower leg: No edema  Left lower leg: No edema  Skin:     General: Skin is warm and dry  Capillary Refill: Capillary refill takes less than 2 seconds  Neurological:      General: No focal deficit present  Mental Status: He is easily aroused  GCS: GCS eye subscore is 4  GCS verbal subscore is 5  GCS motor subscore is 6  Motor: Motor function is intact           Laboratory and Diagnostics:  Results from last 7 days   Lab Units 03/27/21  0531 03/26/21  1836 03/26/21  1408   WBC Thousand/uL 9 10 12 94* 11 10*   HEMOGLOBIN g/dL 13 3 13 8 14 2   HEMATOCRIT % 40 4 42 7 43 6   PLATELETS Thousands/uL 188 212 212   NEUTROS PCT % 72 84* 64   MONOS PCT % 10 8 13*     Results from last 7 days   Lab Units 03/27/21  1620 03/27/21  0525 03/26/21  2355 03/26/21  1837 03/26/21  1408   SODIUM mmol/L 141 139 138 138 136   POTASSIUM mmol/L 4 6 5 1 5 3 5 4* 5 0   CHLORIDE mmol/L 111* 108 107 107 102   CO2 mmol/L 25 23 24 23 24   ANION GAP mmol/L 5 8 7 8 10   BUN mg/dL 41* 42* 46* 47* 45*   CREATININE mg/dL 1 80* 1 91* 2 05* 2 53* 1 99*   CALCIUM mg/dL 9 1 8 7 8 6 9 0 9 2   GLUCOSE RANDOM mg/dL 104 88 106 152* 119*   ALT U/L  --  24  --   --   --    AST U/L  --  18  --   --   --    ALK PHOS U/L  --  50  --   --   --    ALBUMIN g/dL  --  3 2*  --   --   --    TOTAL BILIRUBIN mg/dL  --  0 98  --   --   --      Results from last 7 days   Lab Units 03/27/21  0525 03/26/21  1837 03/26/21  1408   MAGNESIUM mg/dL 2 3 2 3 2 2   PHOSPHORUS mg/dL 4 1 3 8 3 6      Results from last 7 days   Lab Units 03/26/21  1408   INR  1 22*      Results from last 7 days   Lab Units 03/26/21  1408   TROPONIN I ng/mL 0 04*           Micro  3/26/21 COVID: Negative     EKG: V-paced at 60 bpm  Imaging: I have personally reviewed pertinent reports  and I have personally reviewed pertinent films in PACS   No new imaging     Intake and Output  I/O       03/26 0701 - 03/27 0700 03/27 0701 - 03/28 0700    P  O   450    I V  500     Total Intake 500 450    Urine 950 2175    Total Output 950 2175    Net -450 -1725                Height and Weights         There is no height or weight on file to calculate BMI  Weight (last 2 days)     None            Nutrition       Diet Orders   (From admission, onward)             Start     Ordered    03/26/21 2000  Diet Cardiovascular; Cardiac  Diet effective now     Question Answer Comment   Diet Type Cardiovascular    Cardiac Cardiac    RD to adjust diet per protocol?  Yes        03/26/21 2000                  Active Medications  Scheduled Meds:  Current Facility-Administered Medications   Medication Dose Route Frequency Provider Last Rate    aspirin  81 mg Oral Daily Sandrita Villa PA-C      atorvastatin  40 mg Oral Daily With Arias Whyte PA-C      heparin (porcine)  5,000 Units Subcutaneous [facility] Sandrita Villa [place]      insulin lispro  1-5 Units Subcutaneous HS Sandrita Villa PA-C      insulin lispro  1-6 Units Subcutaneous TID AC Jin Alberto PA-C      melatonin  6 mg Oral HS Carole Wallace [place]      PreserVision AREDS 2  1 capsule Oral BID ÁNGEL LOCKHART      senna-docusate sodium  1 tablet Oral BID Carole Wallace PA-C       Continuous Infusions:     PRN Meds:        Invasive Devices Review  Invasive Devices     Peripheral Intravenous Line            Peripheral IV 03/26/21 Left Antecubital 1 day    Peripheral IV 03/26/21 Right Wrist 1 day                Rationale for remaining devices: N/A    ---------------------------------------------------------------------------------------  Care Time Delivered:   No Critical Care time spent       ÁNGEL Gunter      Portions of the record may have been created with voice recognition software  Occasional wrong word or "sound a like" substitutions may have occurred due to the inherent limitations of voice recognition software    Read the chart carefully and recognize, using context, where substitutions have occurred

## 2021-03-28 NOTE — CASE MANAGEMENT
Not a bundle  Not a readmission  Met with pt & explained CM role  Pt lives with wife & son in ranch home with 6 adia  Reports was [employer], retired & does drive  DME cane, rw  H/o vna, unsure name  H/o OPPT  Denies any MH  D&A tx  Uses [facility] in [facility] pass  Emergency contact, wife Tricia Challenger [phone]  CM reviewed d/c planning process including the following: identifying help at home, patient preference for d/c planning needs, Discharge Lounge, [facility] Meds to Bed program, availability of treatment team to discuss questions or concerns patient and/or family may have regarding understanding medications and recognizing signs and symptoms once discharged  CM also encouraged patient to follow up with all recommended appointments after discharge  Patient advised of importance for patient and family to participate in managing patients medical well being

## 2021-03-28 NOTE — ASSESSMENT & PLAN NOTE
· No known history of diabetes  · Hemoglobin A1c 5 8% on 3/27/21  · AC/HS glucose checks and sliding scale coverage  ?  Discontinue if no coverage needed today   · Change to carb control diet as needed  · Adjust insulin regimen as needed to maintain goal -180

## 2021-03-29 ENCOUNTER — ANESTHESIA EVENT (OUTPATIENT)
Dept: ANESTHESIOLOGY | Facility: HOSPITAL | Age: 86
End: 2021-03-29

## 2021-03-29 ENCOUNTER — APPOINTMENT (INPATIENT)
Dept: NON INVASIVE DIAGNOSTICS | Facility: HOSPITAL | Age: 86
DRG: 242 | End: 2021-03-29
Payer: COMMERCIAL

## 2021-03-29 ENCOUNTER — ANESTHESIA (OUTPATIENT)
Dept: ANESTHESIOLOGY | Facility: HOSPITAL | Age: 86
End: 2021-03-29

## 2021-03-29 ENCOUNTER — APPOINTMENT (INPATIENT)
Dept: RADIOLOGY | Facility: HOSPITAL | Age: 86
DRG: 242 | End: 2021-03-29
Payer: COMMERCIAL

## 2021-03-29 LAB
ANION GAP SERPL CALCULATED.3IONS-SCNC: 8 MMOL/L (ref 4–13)
BASOPHILS # BLD AUTO: 0.06 THOUSANDS/ΜL (ref 0–0.1)
BASOPHILS NFR BLD AUTO: 1 % (ref 0–1)
BUN SERPL-MCNC: 28 MG/DL (ref 5–25)
CALCIUM SERPL-MCNC: 8.7 MG/DL (ref 8.3–10.1)
CHLORIDE SERPL-SCNC: 106 MMOL/L (ref 100–108)
CO2 SERPL-SCNC: 25 MMOL/L (ref 21–32)
CREAT SERPL-MCNC: 1.09 MG/DL (ref 0.6–1.3)
EOSINOPHIL # BLD AUTO: 0.36 THOUSAND/ΜL (ref 0–0.61)
EOSINOPHIL NFR BLD AUTO: 4 % (ref 0–6)
ERYTHROCYTE [DISTWIDTH] IN BLOOD BY AUTOMATED COUNT: 13.1 % (ref 11.6–15.1)
GFR SERPL CREATININE-BSD FRML MDRD: 61 ML/MIN/1.73SQ M
GLUCOSE SERPL-MCNC: 104 MG/DL (ref 65–140)
GLUCOSE SERPL-MCNC: 88 MG/DL (ref 65–140)
GLUCOSE SERPL-MCNC: 96 MG/DL (ref 65–140)
GLUCOSE SERPL-MCNC: 96 MG/DL (ref 65–140)
HCT VFR BLD AUTO: 43.8 % (ref 36.5–49.3)
HGB BLD-MCNC: 14.6 G/DL (ref 12–17)
IMM GRANULOCYTES # BLD AUTO: 0.03 THOUSAND/UL (ref 0–0.2)
IMM GRANULOCYTES NFR BLD AUTO: 0 % (ref 0–2)
LYMPHOCYTES # BLD AUTO: 1.8 THOUSANDS/ΜL (ref 0.6–4.47)
LYMPHOCYTES NFR BLD AUTO: 21 % (ref 14–44)
MCH RBC QN AUTO: 32.7 PG (ref 26.8–34.3)
MCHC RBC AUTO-ENTMCNC: 33.3 G/DL (ref 31.4–37.4)
MCV RBC AUTO: 98 FL (ref 82–98)
MONOCYTES # BLD AUTO: 0.99 THOUSAND/ΜL (ref 0.17–1.22)
MONOCYTES NFR BLD AUTO: 12 % (ref 4–12)
NEUTROPHILS # BLD AUTO: 5.25 THOUSANDS/ΜL (ref 1.85–7.62)
NEUTS SEG NFR BLD AUTO: 62 % (ref 43–75)
NRBC BLD AUTO-RTO: 0 /100 WBCS
PLATELET # BLD AUTO: 185 THOUSANDS/UL (ref 149–390)
PMV BLD AUTO: 10.8 FL (ref 8.9–12.7)
POTASSIUM SERPL-SCNC: 4.2 MMOL/L (ref 3.5–5.3)
RBC # BLD AUTO: 4.46 MILLION/UL (ref 3.88–5.62)
SODIUM SERPL-SCNC: 139 MMOL/L (ref 136–145)
WBC # BLD AUTO: 8.49 THOUSAND/UL (ref 4.31–10.16)

## 2021-03-29 PROCEDURE — C1785 PMKR, DUAL, RATE-RESP: HCPCS

## 2021-03-29 PROCEDURE — 71045 X-RAY EXAM CHEST 1 VIEW: CPT

## 2021-03-29 PROCEDURE — 02HK3JZ INSERTION OF PACEMAKER LEAD INTO RIGHT VENTRICLE, PERCUTANEOUS APPROACH: ICD-10-PCS | Performed by: INTERNAL MEDICINE

## 2021-03-29 PROCEDURE — C1769 GUIDE WIRE: HCPCS | Performed by: PHYSICIAN ASSISTANT

## 2021-03-29 PROCEDURE — C1892 INTRO/SHEATH,FIXED,PEEL-AWAY: HCPCS | Performed by: PHYSICIAN ASSISTANT

## 2021-03-29 PROCEDURE — 99233 SBSQ HOSP IP/OBS HIGH 50: CPT | Performed by: NURSE PRACTITIONER

## 2021-03-29 PROCEDURE — 33208 INSRT HEART PM ATRIAL & VENT: CPT | Performed by: INTERNAL MEDICINE

## 2021-03-29 PROCEDURE — 33208 INSRT HEART PM ATRIAL & VENT: CPT | Performed by: PHYSICIAN ASSISTANT

## 2021-03-29 PROCEDURE — 82948 REAGENT STRIP/BLOOD GLUCOSE: CPT

## 2021-03-29 PROCEDURE — 0JH606Z INSERTION OF PACEMAKER, DUAL CHAMBER INTO CHEST SUBCUTANEOUS TISSUE AND FASCIA, OPEN APPROACH: ICD-10-PCS | Performed by: INTERNAL MEDICINE

## 2021-03-29 PROCEDURE — 80048 BASIC METABOLIC PNL TOTAL CA: CPT | Performed by: PHYSICIAN ASSISTANT

## 2021-03-29 PROCEDURE — C1898 LEAD, PMKR, OTHER THAN TRANS: HCPCS

## 2021-03-29 PROCEDURE — 85025 COMPLETE CBC W/AUTO DIFF WBC: CPT | Performed by: PHYSICIAN ASSISTANT

## 2021-03-29 PROCEDURE — 02H63MZ INSERTION OF CARDIAC LEAD INTO RIGHT ATRIUM, PERCUTANEOUS APPROACH: ICD-10-PCS | Performed by: INTERNAL MEDICINE

## 2021-03-29 PROCEDURE — 93005 ELECTROCARDIOGRAM TRACING: CPT

## 2021-03-29 RX ORDER — PROPOFOL 10 MG/ML
INJECTION, EMULSION INTRAVENOUS AS NEEDED
Status: DISCONTINUED | OUTPATIENT
Start: 2021-03-29 | End: 2021-03-29

## 2021-03-29 RX ORDER — LIDOCAINE HYDROCHLORIDE 10 MG/ML
INJECTION, SOLUTION EPIDURAL; INFILTRATION; INTRACAUDAL; PERINEURAL CODE/TRAUMA/SEDATION MEDICATION
Status: COMPLETED | OUTPATIENT
Start: 2021-03-29 | End: 2021-03-29

## 2021-03-29 RX ORDER — ACETAMINOPHEN 325 MG/1
650 TABLET ORAL EVERY 6 HOURS PRN
Status: DISCONTINUED | OUTPATIENT
Start: 2021-03-29 | End: 2021-03-30 | Stop reason: HOSPADM

## 2021-03-29 RX ORDER — PROPOFOL 10 MG/ML
INJECTION, EMULSION INTRAVENOUS CONTINUOUS PRN
Status: DISCONTINUED | OUTPATIENT
Start: 2021-03-29 | End: 2021-03-29

## 2021-03-29 RX ORDER — GENTAMICIN SULFATE 40 MG/ML
INJECTION, SOLUTION INTRAMUSCULAR; INTRAVENOUS CODE/TRAUMA/SEDATION MEDICATION
Status: COMPLETED | OUTPATIENT
Start: 2021-03-29 | End: 2021-03-29

## 2021-03-29 RX ORDER — SODIUM CHLORIDE 9 MG/ML
INJECTION, SOLUTION INTRAVENOUS CONTINUOUS PRN
Status: DISCONTINUED | OUTPATIENT
Start: 2021-03-29 | End: 2021-03-29

## 2021-03-29 RX ADMIN — Medication 1 CAPSULE: at 09:45

## 2021-03-29 RX ADMIN — HEPARIN SODIUM 5000 UNITS: 5000 INJECTION INTRAVENOUS; SUBCUTANEOUS at 05:49

## 2021-03-29 RX ADMIN — ASPIRIN 81 MG: 81 TABLET, COATED ORAL at 09:00

## 2021-03-29 RX ADMIN — VANCOMYCIN HYDROCHLORIDE 1500 MG: 10 INJECTION, POWDER, LYOPHILIZED, FOR SOLUTION INTRAVENOUS at 14:25

## 2021-03-29 RX ADMIN — GENTAMICIN SULFATE 80 MG: 40 INJECTION, SOLUTION INTRAMUSCULAR; INTRAVENOUS at 16:06

## 2021-03-29 RX ADMIN — ATORVASTATIN CALCIUM 40 MG: 40 TABLET, FILM COATED ORAL at 17:57

## 2021-03-29 RX ADMIN — PHENYLEPHRINE HYDROCHLORIDE 40 MCG/MIN: 10 INJECTION INTRAVENOUS at 15:26

## 2021-03-29 RX ADMIN — PROPOFOL 80 MCG/KG/MIN: 10 INJECTION, EMULSION INTRAVENOUS at 15:11

## 2021-03-29 RX ADMIN — LIDOCAINE HYDROCHLORIDE 20 ML: 10 INJECTION, SOLUTION EPIDURAL; INFILTRATION; INTRACAUDAL; PERINEURAL at 15:45

## 2021-03-29 RX ADMIN — Medication 1 CAPSULE: at 20:29

## 2021-03-29 RX ADMIN — HEPARIN SODIUM 5000 UNITS: 5000 INJECTION INTRAVENOUS; SUBCUTANEOUS at 21:20

## 2021-03-29 RX ADMIN — DOCUSATE SODIUM AND SENNOSIDES 1 TABLET: 8.6; 5 TABLET ORAL at 09:54

## 2021-03-29 RX ADMIN — PROPOFOL 40 MG: 10 INJECTION, EMULSION INTRAVENOUS at 15:11

## 2021-03-29 RX ADMIN — SODIUM CHLORIDE: 9 INJECTION, SOLUTION INTRAVENOUS at 14:58

## 2021-03-29 RX ADMIN — MELATONIN TAB 3 MG 6 MG: 3 TAB at 21:20

## 2021-03-29 NOTE — ANESTHESIA PREPROCEDURE EVALUATION
Procedure:  CARDIAC EPS/PACER IMPLANT    CHB s/p temp pacer implant here for PPM  TTE: EF 60%, RV nml fx, PASP 20  Hx mild asthma but well controlled and has not used rescue inhaller  Able to lay flat without SOB    Denies the following: COPD, EBENEZER, seizure      Relevant Problems   CARDIO   (+) Complete heart block (HCC)   (+) Coronary artery disease of bypass graft of native heart with stable angina pectoris (HCC)   (+) Hyperlipidemia   (+) Hypertension      /RENAL   (+) JAMIE (acute kidney injury) ([facility] [address] )      MUSCULOSKELETAL   (+) Gout      NEURO/PSYCH   (+) TIA (transient ischemic attack)        Physical Exam    Airway    Mallampati score: II  TM Distance: >3 FB  Neck ROM: full     Dental   upper dentures,     Cardiovascular      Pulmonary      Other Findings        Anesthesia Plan  ASA Score- 3     Anesthesia Type- IV sedation with anesthesia with ASA Monitors  Additional Monitors:   Airway Plan:           Plan Factors-Exercise tolerance (METS): >4 METS  Chart reviewed  EKG reviewed  Existing labs reviewed  Patient summary reviewed  Patient is not a current smoker  Obstructive sleep apnea risk education given perioperatively  Induction-     Postoperative Plan-     Informed Consent- Anesthetic plan and risks discussed with patient  I personally reviewed this patient with the CRNA  Discussed and agreed on the Anesthesia Plan with the CRNA  Alfredito Rosenthal

## 2021-03-29 NOTE — OCCUPATIONAL THERAPY NOTE
Occupational Therapy Cancel Note     03/29/21 0936   OT Last Visit   OT Visit Date 03/29/21   Note Type   Note type Evaluation   Cancel Reasons Medical status   Assessment   Assessment Orders for OT evaluation received  Per RN, pt has temporary pacer and is to stay in bed for today  OT will continue to follow and evaluate as able

## 2021-03-29 NOTE — PLAN OF CARE
Problem: Potential for Falls  Goal: Patient will remain free of falls  Description: INTERVENTIONS:  - Assess patient frequently for physical needs  -  Identify cognitive and physical deficits and behaviors that affect risk of falls    -  [place] fall precautions as indicated by assessment   - Educate patient/family on patient safety including physical limitations  - Instruct patient to call for assistance with activity based on assessment  - Modify environment to reduce risk of injury  - Consider OT/PT consult to assist with strengthening/mobility  Outcome: Progressing

## 2021-03-29 NOTE — PROGRESS NOTES
[address] [facility]  Progress Note - Karla Furtyson 1934, 80 y o  male MRN: [id number]  Unit/Bed#: [facility] 509-01 Encounter: [id number]  Primary Care Provider: Coco Paz DO   Date and time admitted to hospital: 3/26/2021  4:13 PM    * Complete heart block ([facility] [address] )  Assessment & Plan  · Likely related to ischemic heart disease  · POD #3 s/p temp-perm pacemaker  · EP following appreciate recommendations  · Continues to fully pace at 60 bpm  · Hold all AV ruth blocking agents    · Likely plan for dual chamber PPM today  ? EF 60%, no ICD indication   · TSH WNL   · Optimize electrolytes, K > 4 0, mag > 2 0    JAMIE (acute kidney injury) ([facility] [address] )  Assessment & Plan  · Likely secondary to ATN from low HR and likely hypotension in the presence of ACE-I and lasix as outpatient   · Baseline creatinine: 0 94 - 1 06  · Admission creatinine: 1 99  · Most recent creatinine: 1 46  · Strict q4h I/O monitoring  · Continue lasix IV as needed to keep euvolemic, no evidence of overload   · Maintain adequate renal perfusion pressure   · Continue to follow renal function tests      Coronary artery disease of bypass graft of native heart with stable angina pectoris [facility])  Assessment & Plan  · Cardiac catheterization in November 2018 revealed severe native 3-vessel CVAD with patent grafts, EF 66%  · 3/27/21 Echocardiogram: EF 60%, no RWMA, mild MR, mild TR   · ASA 81mg daily   · Atorvastatin 40mg daily  · Continue to closely monitor telemetry   · Cardiology following      Hypertension  Assessment & Plan  · Currently hemodynamically WNL  · Maintain MAP > 65  · Maintain SBP < 180  · Resume home Imdur 90mg daily today    · Holding home antihypertensives at this time:  ? Benzapril 10mg daily due to JAMIE     Hyperlipidemia  Assessment & Plan  · 3/27/21 Lipid panel:           ? Total cholesterol: 80  ? Triglyceride: 91  ? LDL: 25  ? HDL: 37  · Atorvastatin 40mg qHS   ?  In place of home rosuvastatin 20mg daily Gout  Assessment & Plan  · Holding allopurinol for now due to JAMIE     Prediabetes  Assessment & Plan  · No known history of diabetes  · Hemoglobin A1c 5 8% on 3/27/21  ? SSI discontinued as no coverage required   · Change to carb control diet as needed        ----------------------------------------------------------------------------------------  HPI/24hr events:   Continues to require pacing    Disposition: Continue Stepdown Level 1 level of care   Code Status: Level 1 - Full Code  ---------------------------------------------------------------------------------------  SUBJECTIVE  "I'm doing fine"    Review of Systems   Constitutional: Negative  HENT: Positive for hearing loss  Eyes: Negative  Respiratory: Negative for shortness of breath  Cardiovascular: Negative for chest pain, palpitations and leg swelling  Gastrointestinal: Negative  Endocrine: Negative  Genitourinary: Positive for frequency  Musculoskeletal: Negative  Skin: Negative  Allergic/Immunologic: Negative  Neurological: Negative  Hematological: Negative  Psychiatric/Behavioral: Negative  All other systems reviewed and are negative      Review of systems was reviewed and negative unless stated above in HPI/24-hour events   ---------------------------------------------------------------------------------------  OBJECTIVE    Vitals   Vitals:    21 1500 21 2101 21 2300 21 0242   BP: 150/65 151/70 154/66 140/65   BP Location:  Left arm Left arm Left arm   Pulse: 63 59 59 60   Resp: 16 18 18 18   Temp: (!) 97 3 °F (36 3 °C) (!) 97 3 °F (36 3 °C) 97 5 °F (36 4 °C) 97 6 °F (36 4 °C)   TempSrc: Oral Oral Oral Oral   SpO2: 96% 94% 94% 96%   Weight:         Temp (24hrs), Av 7 °F (36 5 °C), Min:97 3 °F (36 3 °C), Max:98 4 °F (36 9 °C)  Current: Temperature: 97 6 °F (36 4 °C)          Respiratory:  SpO2: SpO2: 96 %  Nasal Cannula O2 Flow Rate (L/min): 3 L/min      Physical Exam  Constitutional:       General: He is not in acute distress  HENT:      Head: Normocephalic and atraumatic  Mouth/Throat:      Mouth: Mucous membranes are moist    Eyes:      Pupils: Pupils are equal, round, and reactive to light  Cardiovascular:      Rate and Rhythm: Normal rate and regular rhythm  Pulses: Normal pulses  Heart sounds: Normal heart sounds  Pulmonary:      Effort: Pulmonary effort is normal       Breath sounds: Normal breath sounds  Abdominal:      General: Abdomen is flat  Bowel sounds are normal       Palpations: Abdomen is soft  Skin:     General: Skin is warm and dry  Capillary Refill: Capillary refill takes less than 2 seconds  Comments: R IJ site with gauze dressing intact   Neurological:      Mental Status: He is alert and oriented to person, place, and time           Laboratory and Diagnostics:  Results from last 7 days   Lab Units 03/28/21  0432 03/27/21  0531 03/26/21  1836 03/26/21  1408   WBC Thousand/uL 9 86 9 10 12 94* 11 10*   HEMOGLOBIN g/dL 13 9 13 3 13 8 14 2   HEMATOCRIT % 42 7 40 4 42 7 43 6   PLATELETS Thousands/uL 172 188 212 212   NEUTROS PCT % 66 72 84* 64   MONOS PCT % 11 10 8 13*     Results from last 7 days   Lab Units 03/28/21  0432 03/27/21  1620 03/27/21  0525 03/26/21  2355 03/26/21  1837 03/26/21  1408   SODIUM mmol/L 140 141 139 138 138 136   POTASSIUM mmol/L 4 4 4 6 5 1 5 3 5 4* 5 0   CHLORIDE mmol/L 110* 111* 108 107 107 102   CO2 mmol/L 26 25 23 24 23 24   ANION GAP mmol/L 4 5 8 7 8 10   BUN mg/dL 36* 41* 42* 46* 47* 45*   CREATININE mg/dL 1 46* 1 80* 1 91* 2 05* 2 53* 1 99*   CALCIUM mg/dL 8 8 9 1 8 7 8 6 9 0 9 2   GLUCOSE RANDOM mg/dL 89 104 88 106 152* 119*   ALT U/L  --   --  24  --   --   --    AST U/L  --   --  18  --   --   --    ALK PHOS U/L  --   --  50  --   --   --    ALBUMIN g/dL  --   --  3 2*  --   --   --    TOTAL BILIRUBIN mg/dL  --   --  0 98  --   --   --      Results from last 7 days   Lab Units 03/28/21  0432 03/27/21  0525 03/26/21  1837 03/26/21  1408   MAGNESIUM mg/dL 2 3 2 3 2 3 2 2   PHOSPHORUS mg/dL  --  4 1 3 8 3 6      Results from last 7 days   Lab Units 03/26/21  1408   INR  1 22*      Results from last 7 days   Lab Units 03/26/21  1408   TROPONIN I ng/mL 0 04*         ABG:    VBG:          Micro        EKG: Paced on monitor  Imaging: none this AM    Intake and Output  I/O       03/27 0701 - 03/28 0700 03/28 0701 - 03/29 0700    P  O  690 840    Total Intake(mL/kg) 690 (5 9) 840 (7 2)    Urine (mL/kg/hr) 2400 (0 9) 2475 (0 9)    Total Output 2400 2475    Net -1710 -1635                Height and Weights      IBW: -88 kg  Body mass index is 37 99 kg/m²  Weight (last 2 days)     Date/Time   Weight    03/28/21 0557   117 (257 28)                Nutrition       Diet Orders   (From admission, onward)             Start     Ordered    03/29/21 0001  Diet NPO; Sips with meds  Diet effective midnight     Question Answer Comment   Diet Type NPO    NPO Except: Sips with meds    RD to adjust diet per protocol?  Yes        03/28/21 1146                  Active Medications  Scheduled Meds:  Current Facility-Administered Medications   Medication Dose Route Frequency Provider Last Rate    aspirin  81 mg Oral Daily Tushar Ryan PA-C      atorvastatin  40 mg Oral Daily With Juan Mcgee PA-C      heparin (porcine)  5,000 Units Subcutaneous [facility] Abhijit Gates      melatonin  6 mg Oral HS Abhijit Gates      PreserVision AREDS 2  1 capsule Oral BID ÁNGEL Hanson      senna-docusate sodium  1 tablet Oral BID Tushar Ryan PA-C      vancomycin  1,500 mg Intravenous Once Charmaine Barragan PA-C       Continuous Infusions:     PRN Meds:        Invasive Devices Review  Invasive Devices     Peripheral Intravenous Line            Peripheral IV 03/26/21 Left Antecubital 2 days    Peripheral IV 03/26/21 Right Wrist 2 days                Rationale for remaining devices: IV access  ---------------------------------------------------------------------------------------  Advance Directive and Living Will:      Power of :    POLST:    ---------------------------------------------------------------------------------------  Care Time Delivered:   No Critical Care time spent       ÁNGEL Zamora      Portions of the record may have been created with voice recognition software  Occasional wrong word or "sound a like" substitutions may have occurred due to the inherent limitations of voice recognition software    Read the chart carefully and recognize, using context, where substitutions have occurred

## 2021-03-29 NOTE — ANESTHESIA POSTPROCEDURE EVALUATION
Post-Op Assessment Note    CV Status:  Stable  Pain Score: 0    Pain management: adequate     Mental Status:  Awake and alert   Hydration Status:  Stable   PONV Controlled:  None   Airway Patency:  Patent and adequate      Post Op Vitals Reviewed: Yes      Staff: CRNA, Anesthesiologist         No complications documented      BP   112/74   Temp     Pulse  65   Resp   16   SpO2   100%

## 2021-03-29 NOTE — PHYSICAL THERAPY NOTE
Physical Therapy Cancellation Note       03/29/21 1129   PT Last Visit   PT Visit Date 03/29/21   Note Type   Note type Evaluation   Cancel Reasons Medical status     PT orders received, chart reviewed  PT spoke to RN who reports pt is currently with temporary pacer and on bed rest   PT to continue to follow and see pt as appropriate and able      Oneil Savage, PT, DPT

## 2021-03-30 ENCOUNTER — APPOINTMENT (INPATIENT)
Dept: RADIOLOGY | Facility: HOSPITAL | Age: 86
DRG: 242 | End: 2021-03-30
Payer: COMMERCIAL

## 2021-03-30 VITALS
BODY MASS INDEX: 36.95 KG/M2 | RESPIRATION RATE: 20 BRPM | WEIGHT: 250.22 LBS | OXYGEN SATURATION: 95 % | HEART RATE: 78 BPM | DIASTOLIC BLOOD PRESSURE: 77 MMHG | TEMPERATURE: 97.8 F | SYSTOLIC BLOOD PRESSURE: 151 MMHG

## 2021-03-30 LAB
ANION GAP SERPL CALCULATED.3IONS-SCNC: 5 MMOL/L (ref 4–13)
BUN SERPL-MCNC: 23 MG/DL (ref 5–25)
CALCIUM SERPL-MCNC: 8.8 MG/DL (ref 8.3–10.1)
CHLORIDE SERPL-SCNC: 106 MMOL/L (ref 100–108)
CO2 SERPL-SCNC: 28 MMOL/L (ref 21–32)
CREAT SERPL-MCNC: 1.07 MG/DL (ref 0.6–1.3)
ERYTHROCYTE [DISTWIDTH] IN BLOOD BY AUTOMATED COUNT: 12.8 % (ref 11.6–15.1)
GFR SERPL CREATININE-BSD FRML MDRD: 63 ML/MIN/1.73SQ M
GLUCOSE SERPL-MCNC: 86 MG/DL (ref 65–140)
GLUCOSE SERPL-MCNC: 89 MG/DL (ref 65–140)
GLUCOSE SERPL-MCNC: 93 MG/DL (ref 65–140)
HCT VFR BLD AUTO: 44.9 % (ref 36.5–49.3)
HGB BLD-MCNC: 15.1 G/DL (ref 12–17)
MCH RBC QN AUTO: 33.3 PG (ref 26.8–34.3)
MCHC RBC AUTO-ENTMCNC: 33.6 G/DL (ref 31.4–37.4)
MCV RBC AUTO: 99 FL (ref 82–98)
PLATELET # BLD AUTO: 196 THOUSANDS/UL (ref 149–390)
PMV BLD AUTO: 10.8 FL (ref 8.9–12.7)
POTASSIUM SERPL-SCNC: 4.4 MMOL/L (ref 3.5–5.3)
RBC # BLD AUTO: 4.54 MILLION/UL (ref 3.88–5.62)
SODIUM SERPL-SCNC: 139 MMOL/L (ref 136–145)
WBC # BLD AUTO: 9.58 THOUSAND/UL (ref 4.31–10.16)

## 2021-03-30 PROCEDURE — 82948 REAGENT STRIP/BLOOD GLUCOSE: CPT

## 2021-03-30 PROCEDURE — 99024 POSTOP FOLLOW-UP VISIT: CPT | Performed by: PHYSICIAN ASSISTANT

## 2021-03-30 PROCEDURE — 80048 BASIC METABOLIC PNL TOTAL CA: CPT | Performed by: PHYSICIAN ASSISTANT

## 2021-03-30 PROCEDURE — 97163 PT EVAL HIGH COMPLEX 45 MIN: CPT

## 2021-03-30 PROCEDURE — 71046 X-RAY EXAM CHEST 2 VIEWS: CPT

## 2021-03-30 PROCEDURE — 99239 HOSP IP/OBS DSCHRG MGMT >30: CPT | Performed by: INTERNAL MEDICINE

## 2021-03-30 PROCEDURE — 85027 COMPLETE CBC AUTOMATED: CPT | Performed by: PHYSICIAN ASSISTANT

## 2021-03-30 PROCEDURE — 97167 OT EVAL HIGH COMPLEX 60 MIN: CPT

## 2021-03-30 RX ADMIN — Medication 1 CAPSULE: at 08:35

## 2021-03-30 RX ADMIN — HEPARIN SODIUM 5000 UNITS: 5000 INJECTION INTRAVENOUS; SUBCUTANEOUS at 05:29

## 2021-03-30 RX ADMIN — DOCUSATE SODIUM AND SENNOSIDES 1 TABLET: 8.6; 5 TABLET ORAL at 08:35

## 2021-03-30 RX ADMIN — ASPIRIN 81 MG: 81 TABLET, COATED ORAL at 08:34

## 2021-03-30 NOTE — PLAN OF CARE
Problem: PHYSICAL THERAPY ADULT  Goal: Performs mobility at highest level of function for planned discharge setting  See evaluation for individualized goals  Description: Treatment/Interventions: Functional transfer training, LE strengthening/ROM, Elevations, Therapeutic exercise, Endurance training, Patient/family training, Bed mobility, Equipment eval/education, Gait training, Spoke to nursing  Equipment Recommended: Presley()       See flowsheet documentation for full assessment, interventions and recommendations  Note: Prognosis: Good  Problem List: Decreased strength, Decreased endurance, Impaired balance, Decreased mobility, Impaired hearing, Orthopedic restrictions  Assessment: Pt seen for high complexity PT evaluation due to decrease in functional mobility status compared to baseline  Pt with active PT eval/treat orders at this time  Pt is a 80 y o  M who presented to [facility] as a transfer from [facility] where he initially presented with acute onset of exercise intolerance and SOB  Pt transferred to [facility] where he was temporarily paced and is now s/p dual chamber pacemaker placement on 3/29/21  Pt resides with wife and son in [facility] with 6[facility]  Pt presents with decreased strength, balance, endurnace that contribute to limitations in bed mobility, functional transfers, functional mobility  Pt requires supervision for all bed mobility, STS, ambulation without AD, and stair negotiation at this time  Pt left upright in bedside chair with chair alarm intact and with all needs in reach  Pt will benefit from skilled therapy in order to address current impairments and functional limitations  PT to follow pt and recommending HHPT once medically cleared  The patient's AM-PAC Basic Mobility Inpatient Short Form Raw Score is 18, Standardized Score is 41 05  A standardized score less than 42 9 suggests the patient may benefit from discharge to post-acute rehabilitation services   Please also refer to the recommendation of the Physical Therapist for safe discharge planning  Based on pt current functional status and social support recommend DC to HHPT  Barriers to Discharge: None     PT Discharge Recommendation: Home with skilled therapy(HHPT)     PT - OK to Discharge: Yes(once medically cleared)    See flowsheet documentation for full assessment

## 2021-03-30 NOTE — PLAN OF CARE
Problem: OCCUPATIONAL THERAPY ADULT  Goal: Performs self-care activities at highest level of function for planned discharge setting  See evaluation for individualized goals  Description: Treatment Interventions: ADL retraining, Functional transfer training, UE strengthening/ROM, Endurance training, Patient/family training, Equipment evaluation/education, Compensatory technique education, Energy conservation, Activityengagement          See flowsheet documentation for full assessment, interventions and recommendations  Note: Limitation: Decreased ADL status, Decreased UE strength, Decreased endurance, Decreased self-care trans, Decreased high-level ADLs  Prognosis: Good  Assessment: Pt is a 80 y o  male admitted to [facility] on 3/26/2021 w/ Complete Heart Block  Comorbidities include a h/o HTN, HLD, JAMIE, Prediabetes, Gout, CAD  Pt presents s/p pacemaker placement with LUE sling and PPM precautions  Pt with active OT orders  Pt resides in a [facility] with 6[facility] with his son and wife  Full bathroom equipped with walk-in and tub/shw with GBs, SC and toilet with GBs  Pt was I w/ ADLS and IADLS, (+) drove, & required use of RW and SPC PTA  Currently pt requires Min A LB ADLs, SUP toileting, SUP bed mobility, SUP functional transfers and SUP functional mobility with no AD  Pt is limited at this time 2*: endurance, activity tolerance, functional mobility, balance, functional standing tolerance, decreased I w/ ADLS/IADLS and strength  The following Occupational Performance Areas to address include: bathing/shower, toilet hygiene, dressing, functional mobility, community mobility, clothing management and household maintenance  Pt scored overall  70/100 on the Barthel Index  The patient's raw score on the AM-PAC Daily Activity inpatient short form is 19, standardized score is 40 22, greater than 39 4  Patients at this level are likely to benefit from D/C home with home therapy   Please refer to the recommendation of the Occupational Therapist for safe DC planning  Based on the aforementioned OT evaluation, functional performance deficits, and assessments, pt has been identified as a high complexity evaluation  From OT standpoint, anticipate d/c home with family support and home OT  Pt to continue to benefit from acute immediate OT services to address the following goals 3-5x/week to  w/in 7-10 days:   OT Discharge Recommendation: Home with skilled therapy  OT - OK to Discharge:  Yes

## 2021-03-30 NOTE — RESTORATIVE TECHNICIAN NOTE
Restorative Specialist Mobility Note       Activity: Ambulate in burrell     Assistive Device: None        Repositioned: [place]'s              Anti-Embolism Device On:  Bilateral, Sequential compression devices, below knee

## 2021-03-30 NOTE — DISCHARGE INSTRUCTIONS
Please refer to post pacemaker implantation discharge instructions and restrictions and your pacemaker booklet/temporary card  Keep incision dry for one week  Do not use lotions/powders/creams on incision  Leave outer bandage in place for 1 week - it is water proof, and as long as it is fully adhered to your skin you may shower with it  If it appears as though the bandage is coming off and/or there is any communication to the area of device incision, please then keep the whole area dry for the remaining week  After 1 week, please remove by pulling all edges away from the center of the bandage  No overhead reaching/pushing/pulling/lifting greater than 5-10lbs with left arm for six weeks  Please call the office if you notice redness, swelling, bleeding, or drainage from incision or if you develop fevers  AFTER PACEMAKER CARE:    If you have any questions, please call [phone] to speak with a nurse (8:30am-4pm, or [phone] after hours)  For appointments, please call [phone]  WHAT YOU SHOULD KNOW:   A pacemaker is a small, battery-powered device that is placed under your skin in your upper chest area with wires placed through a vein that lead directly into the heart  It helps regulate your heart rate and prevent your heart from beating too slowly  AFTER YOU LEAVE:     Medicines:     · Pain medicine: You may need medicine to take away or decrease pain  ¨ Learn how to take your medicine  Ask what medicine and how much you should take  Be sure you know how, when, and how often to take it  Usually Over the counter pain medicine is sufficient to control pain (Acetominophen or Ibuprofen) Ask your doctor if you may take these  If this does not control your pain, narcotic pain killers may be prescribed, please call if you need prescription  ¨ Do not wait until the pain is severe before you take your medicine  Tell caregivers if your pain does not decrease      ¨ Pain medicine can make you dizzy or sleepy  Prevent falls by calling someone when you get out of bed or if you need help  Take your medicine as directed  Call your healthcare provider if you think your medicine is not helping or if you have side effects  Tell him if you are allergic to any medicine  Follow up with your cardiologist after your procedure: You will need a follow-up visit approximately 2 weeks after you leave the hospital  Your cardiologist will check your wound and make sure that your pacemaker is working correctly  Follow the instructions to check your pacemaker: Your cardiologist or primary healthcare provider will check your pacemaker and the battery regularly  He will use a computer to check your pacemaker over the telephone or wireless device which will be given to you  Pacemaker batteries usually last 8 to 10 years  The pacemaker unit will be replaced when the battery gets low  This is a simpler procedure than the original one to implant your pacemaker  Wound care:  Keep your incision dry for one week  Do not use lotions/powders/creams on incision  Leave outer bandage in place for 1 week - it is water proof, and as long as it is fully adhered to your skin you may shower with it  If it appears as though the bandage is coming off and/or there is any communication to the area of device incision, please then keep the whole area dry for the remaining week  After 1 week, please remove by pulling all edges away from the center of the bandage  Please call the office if you notice redness, swelling, bleeding, or drainage from incision or if you develop fevers  Activity:   · Arm movement and lifting:  Be careful using the arm on the side of your pacemaker  Do not move your arm for the first 24 hours after your procedure  Do not  lift your arm above your shoulder or lift more than 10 pounds for one month after your procedure   Avoid pushing, pulling, or repetitive arm movements for one month  This helps the leads stay in place and helps your wound heal  Ask your caregiver when you can drive after your procedure  You may move your arm side to side without lifting above your shoulder, and do not need to wear a sling at home  · Driving: you are ok to drive 48 hours after pacemaker is implanted   · Sports:  Ask your caregiver when it is okay to play tennis, golf, basketball, or any sport that requires you to lift your arms  Do not play full contact sports, such as football, that could damage your pacemaker  Ask your cardiologist or primary healthcare provider how much and what kinds of physical activity are safe for you  Living with a pacemaker:   · Tell all caregivers you have a pacemaker: This includes surgeons, radiologists, and medical technicians  You may want to wear a medical alert ID bracelet or necklace that states that you have a pacemaker  · Carry your pacemaker ID card: Make sure you receive a pacemaker ID card  Carry it with you at all times  It lists important information about your pacemaker  Show it to airport security if you travel  · Avoid electrical interference:  Avoid welding equipment and other equipment with large magnets or electric fields  These things could interfere with how your pacemaker works  Use your cell phone on the ear opposite from your pacemaker  Do not carry your cell phone in your shirt pocket over your chest      · Some Pacemakers are MRI safe  Ask you doctor if it is safe to proceed with MRI and let the radiologist and staff know you have a pacemaker  · Do not touch the skin around your pacemaker: This can cause damage to the lead wires or move the pacemaker unit from where it should be  Contact your cardiologist or primary healthcare provider if:   · The area around your pacemaker has increasing amount of pain after surgery  The pain should improve over first few days after implantation       · The skin around your stitches has increasing redness, swelling, or has drainage  This may mean that you have an infection  · You have a fever  · You have chills, a cough, and feel weak or achy  These are also signs of infection  · Your feet or ankles are more swollen than your baseline  · Your Heart rate is less than 50 beats per minute     Seek care immediately if:   · Your bandage becomes soaked with blood  · Your pacemaker is swelling rapidly    · Your stitches open up  · You feel your heart suddenly beating very slowly or quickly  · You become too weak or dizzy to stand, or you pass out  · Your arm or leg feels warm, tender, and painful  It may look swollen and red  · You have chest pain that does not go away with rest or medicine  · You feel lightheaded, short of breath, and have chest pain  · You cough up blood  © 2014 [address] is for End User's use only and may not be sold, redistributed or otherwise used for commercial purposes  All illustrations and images included in CareNotes® are the copyrighted property of [employer]  or [employer]itesh Carpio  The above information is an  only  It is not intended as medical advice for individual conditions or treatments  Talk to your doctor, nurse or pharmacist before following any medical regimen to see if it is safe and effective for you

## 2021-03-30 NOTE — NURSING NOTE
Discharge instructions given to patient and reviewed with patient and son  Patient asked that RN mainly review with son  Per son, he verbalized understanding of the instructions that were given  IV removed  Belongings sent with patient  Patient escorted via hospital staff via wheelchair

## 2021-03-30 NOTE — PHYSICAL THERAPY NOTE
Physical Therapy Evaluation     Patient's Name: Constantino Hansen    Admitting Diagnosis  Heart block [I45 9]    Problem List  Patient Active Problem List   Diagnosis    Complete heart block ([facility] )    Hypertension    Hyperlipidemia    [facility]    Prediabetes    Gout    Coronary artery disease of bypass graft of native heart with stable angina pectoris ([facility] )    Morbid obesity ([facility] )    TIA (transient ischemic attack)       Past Medical History  Past Medical History:   Diagnosis Date    Heart disease     Hypertension        Past Surgical History  History reviewed  No pertinent surgical history  03/30/21 1115   PT Last Visit   PT Visit Date 03/30/21   Note Type   Note type Evaluation   Pain Assessment   Pain Assessment Tool Pain Assessment not indicated - pt denies pain   Home Living   Type of [address] [facility] One level  (6STE)   Bathroom Shower/Tub Tub/shower unit  (and WIS)   Bathroom Toilet Standard   Bathroom Equipment Shower chair;Grab bars around toilet   Home Equipment Walker;Cane  (reports use of both PTA)   Prior Function   Level of [place] Independent with ADLs and functional mobility   Lives With Spouse; Son   ADL Assistance Independent   IADLs Independent   Falls in the last 6 months 0   Vocational Retired   Comments +  Pt reports wife is home and in good health, able to assist   Pt son is able to assist as well  Restrictions/Precautions   Weight Bearing Precautions Per Order No   Braces or Orthoses Sling  (L UE)   Other Precautions Fall Risk  (PPM precautions)   General   Family/Caregiver Present No   Cognition   Overall Cognitive Status WFL   Arousal/Participation Alert   Orientation Level Oriented X4   Memory Within functional limits   Following Commands Follows all commands and directions without difficulty   Comments Pt VERY [place], reports family is bringing in hearing aides later  Pt requires repetative cues due to inability to hear     RLE Assessment   RLE Assessment   (functionally 4-/5)   LLE Assessment   LLE Assessment   (functionally 4-/5)   Bed Mobility   Supine to Sit 5  Supervision   Additional items HOB elevated; Bedrails   Additional Comments Supine in bed upon PT arrival   Pt left upright in bedside chair with chair alarm intact and all needs in reach at end of therapy session  Transfers   Sit to Stand 5  Supervision   Additional items Verbal cues; Increased time required   Stand to Sit 5  Supervision   Additional items Verbal cues; Increased time required   Additional Comments Transfers without AD  VC for hand placement and safety  Ambulation/Elevation   Gait pattern Wide JOE; Excessively slow; Improper Weight shift  (increased lateral sway, unsteady)   Gait Assistance 5  Supervision   Assistive Device None   Distance 50 ft x 2   Stair Management Assistance   (CGA)   Additional items Assist x 1;Verbal cues; Tactile cues   Stair Management Technique One rail R;Step to pattern; Foreward   Number of Stairs 7   Balance   Static Sitting Fair +   Dynamic Sitting Fair   Static Standing Fair -   Dynamic Standing Fair -   Ambulatory Poor +   Endurance Deficit   Endurance Deficit Yes   Endurance Deficit Description SOB   Activity Tolerance   Activity Tolerance Patient limited by fatigue; Other (Comment)  (SOB)   Medical Staff Made Aware [facility] [place]   Nurse Made Aware RN cleared pt to be seen by PT   Assessment   Prognosis Good   Problem List Decreased strength;Decreased endurance; Impaired balance;Decreased mobility; Impaired hearing;Orthopedic restrictions   Assessment Pt seen for high complexity PT evaluation due to decrease in functional mobility status compared to baseline  Pt with active PT eval/treat orders at this time  Pt is a 80 y o  M who presented to [facility] on 3/26/21 as a transfer from [facility] where he initially presented with acute onset of exercise intolerance and SOB    Pt transferred to [facility] where he was temporarily paced and is now s/p dual chamber pacemaker placement on 3/29/21  Pt resides with wife and son in [facility] with 6STE  Pt presents with decreased strength, balance, endurnace that contribute to limitations in bed mobility, functional transfers, functional mobility  Pt requires supervision for all bed mobility, STS, ambulation without AD, and stair negotiation at this time  Pt left upright in bedside chair with chair alarm intact and with all needs in reach  Pt will benefit from skilled therapy in order to address current impairments and functional limitations  PT to follow pt and recommending HHPT once medically cleared  The patient's AM-PAC Basic Mobility Inpatient Short Form Raw Score is 18, Standardized Score is 41 05  A standardized score less than 42 9 suggests the patient may benefit from discharge to post-acute rehabilitation services  Please also refer to the recommendation of the Physical Therapist for safe discharge planning  Based on pt current functional status and social support recommend DC to HHPT  Barriers to Discharge None   Goals   Patient Goals to go home   STG Expiration Date 04/13/21   Short Term Goal #1 1  Pt will demonstrate ability to perform all aspects of bed mobility with I in order to increase independence and decrease burden on caregivers  2  Pt will demonstrate ability to perform functional transfers with Mod I in order to increase independence and decrease burden on caregivers  3  Pt will demonstrate ability to ambulate 200 ft with least restrictive AD with Mod I in order to return to mobility safely  4  Pt will demonstrate ability to negotiate 6 steps with/without HR and Mod I in order to return to household/community mobility safely  5  Pt will demonstrate improved balance by one grade order to decrease risk of falls  6  Pt will increase b/l LE strength by 1 grade in order to increase ease of functional mobility and transfers     Plan   Treatment/Interventions Functional transfer training;LE strengthening/ROM; Elevations; Therapeutic exercise; Endurance training;Patient/family training;Bed mobility; Equipment eval/education;Gait training;Spoke to nursing   PT Frequency Other (Comment)  (3-5x/wk)   Recommendation   PT Discharge Recommendation Home with skilled therapy  (HHPT)   Equipment Recommended Cane  ([address]   PT - OK to Discharge Yes  (once medically cleared)   AM-PAC Basic Mobility Inpatient   Turning in Bed Without Bedrails 3   Lying on Back to Sitting on Edge of Flat Bed 3   Moving Bed to Chair 3   Standing Up From Chair 3   Walk in Room 3   Climb 3-5 Stairs 3   Basic Mobility Inpatient Raw Score 18   Basic Mobility Standardized Score 41 05   Modified Robyn Scale   Modified Robyn Scale 4         Annelise Lopez, PT, DPT

## 2021-03-30 NOTE — DISCHARGE SUMMARY
Discharge Summary - [facility] Internal Medicine    Patient Information: Luci Russo 80 y o  male MRN: [id number]  Unit/Bed#: [facility] 509-01 Encounter: [id number]    Discharging Physician / Practitioner: Claudetta Daunt, MD  PCP: Cathie Beltran DO  Admission Date: 3/26/2021  Discharge Date: 03/30/21    Disposition:     Home    Reason for Admission:  Dizzy shortness of breath    Discharge Diagnoses:     Principal Problem:    Complete heart block ([facility] )  Active Problems:    Hypertension    Hyperlipidemia    [facility]) ([facility] )    Prediabetes    Gout    Coronary artery disease of bypass graft of native heart with stable angina pectoris ([facility] )  Resolved Problems:    Leukocytosis      Consultations During Hospital Stay:  · Cardiology    Procedures Performed:     · Chest x-ray no active lung disease  · Dual Chamber Permanent Pacemaker Implantation       Hospital Course:     Luci Russo is a 80 y o  male patient who originally presented to the hospital on 3/26/2021 due to shortness of breath dizziness  Patient was admitted to the ICU from [facility] for heart rates of 20 is  He required pacemaker  He was evaluated by Cardiology underwent dual-chamber permanent pacemaker implantation  He reports feeling better, patient is cleared by Cardiology  Will resume his medications  Kindly review the chart for details      Condition at Discharge: fair     Discharge Day Visit / Exam:     Subjective:      Comfortably sitting up in chair  Reports feeling better  Agreeable to discharge plan    Vitals: Blood Pressure: 151/77 (03/30/21 0713)  Pulse: 78 (03/30/21 0713)  Temperature: 97 8 °F (36 6 °C) (03/30/21 0713)  Temp Source: Oral (03/29/21 1108)  Respirations: 20 (03/30/21 0713)  Weight - Scale: 113 kg (250 lb 3 6 oz) (03/30/21 0600)  SpO2: 95 % (03/30/21 0713)  Exam:   Physical Exam    Comfortably sitting up in chair  Obese  Short thick neck  Lungs diminished breath sounds bilaterally  Heart sounds S1-S2 noted  Abdomen soft  Awake obeys simple commands  Pulses noted  No rash    Discharge instructions/Information to patient and family:   See after visit summary for information provided to patient and family  Discharge plan discussed with Cardiology  Discharge plan discussed the patient, updated Son Oralia Jerry   Outpatient follow up with Cardiology, PCP    Provisions for Follow-Up Care:  See after visit summary for information related to follow-up care and any pertinent home health orders  Planned Readmission: no     Discharge Statement:  I spent 45 minutes discharging the patient  This time was spent on the day of discharge  I had direct contact with the patient on the day of discharge  Greater than 50% of the total time was spent examining patient, answering all patient questions, arranging and discussing plan of care with patient as well as directly providing post-discharge instructions  Additional time then spent on discharge activities  Discharge Medications:  See after visit summary for reconciled discharge medications provided to patient and family        ** Please Note: This note has been constructed using a voice recognition system **

## 2021-03-30 NOTE — OCCUPATIONAL THERAPY NOTE
Occupational Therapy Evaluation     Patient Name: Bess Mixon  [id number] Date: 3/30/2021  Problem List  Principal Problem:    Complete heart block (HCC)  Active Problems:    Hypertension    Hyperlipidemia    JAMIE (acute kidney injury) ([facility] [address] [facility] )    Prediabetes    Gout    Coronary artery disease of bypass graft of native heart with stable angina pectoris [facility])    Past Medical History  Past Medical History:   Diagnosis Date    Heart disease     Hypertension      Past Surgical History  History reviewed  No pertinent surgical history  03/30/21 1114   OT Last Visit   OT Visit Date 03/30/21   Note Type   Note type Evaluation   Restrictions/Precautions   Weight Bearing Precautions Per Order No   Braces or Orthoses Sling  (LUE)   Other Precautions Fall Risk;Telemetry;Hard of hearing  (PPM Precautions)   Pain Assessment   Pain Assessment Tool Pain Assessment not indicated - pt denies pain   Pain Score No Pain   Home Living   Type of Home House   Home Layout One level;Stairs to enter with rails  (6STE)   Bathroom Shower/Tub Walk-in shower  (also has tub/shw)   Bathroom Toilet Standard   Bathroom Equipment Grab bars in shower; Shower chair;Grab bars around toilet   Home Equipment Walker;Cane   Additional Comments Pt reports use of both RW and cane at baseline   Prior Function   Level of [place] Independent with ADLs and functional mobility   Lives With Spouse; Son   [place] Help From Family   ADL Assistance Independent   IADLs Independent   Falls in the last 6 months 0   Vocational Retired   Comments +drives   Reports family can A upon discharge   ADL   Eating Assistance 7  Independent   Grooming Assistance 7  [address] [facility] 5  Supervision/Setup   LB Bathing Assistance 4  Minimal [facility] 5  Supervision/Setup    [facility] 4  Minimal [address]  5  Supervision/Setup   Bed Mobility   Supine to Sit 5  Supervision   Additional items [facility] elevated; Bedrails   Sit to Supine Unable to assess   Additional Comments Pt left seated OOBTC at end of evaluation   Transfers   Sit to Stand 5  Supervision   Additional items Increased time required;Verbal cues   Stand to Sit 5  Supervision   Additional items Increased time required;Verbal cues   Additional Comments Transfers with no AD - VCs for hand placement and to follow PPM precautions   Functional Mobility   Functional Mobility 5  Supervision   Additional Comments no AD   Balance   Static Sitting Fair +   Dynamic Sitting Fair   Static Standing Fair   Dynamic Standing Fair -   Activity Tolerance   Activity Tolerance Patient limited by fatigue;Treatment limited secondary to medical complications (Comment)  (SOB)   Medical Staff Made Aware PT [facility]   Nurse Made Aware yes   RUE Assessment   RUE Assessment WFL   LUE Assessment   LUE Assessment X  (DNT due to sling and PPM precautions)   Hand Function   Gross Motor Coordination Functional  (RUE)   Fine Motor Coordination Functional  (BUE)   Cognition   Overall Cognitive Status WFL   Arousal/Participation Alert; Cooperative   Attention Within functional limits   Orientation Level Oriented X4   Memory Within functional limits   Following Commands Follows all commands and directions without difficulty   Comments Pt very [place] and required repeated instructions at times   Assessment   Limitation Decreased ADL status; Decreased UE strength;Decreased endurance;Decreased self-care trans;Decreased high-level ADLs   Prognosis Good   Assessment Pt is a 80 y o  male admitted to [facility] on 3/26/2021 w/ Complete Heart Block  Comorbidities include a h/o HTN, HLD, JAMIE, Prediabetes, Gout, CAD  Pt presents s/p pacemaker placement with LUE sling and PPM precautions  Pt with active OT orders  Pt resides in a [facility] with 6E with his son and wife  Full bathroom equipped with walk-in and tub/shw with GBs, SC and toilet with GBs   Pt was I w/ ADLS and IADLS, (+) drove, & required use of RW and SPC PTA  Currently pt requires Min A LB ADLs, SUP toileting, SUP bed mobility, SUP functional transfers and SUP functional mobility with no AD  Pt is limited at this time 2*: endurance, activity tolerance, functional mobility, balance, functional standing tolerance, decreased I w/ ADLS/IADLS and strength  The following Occupational Performance Areas to address include: bathing/shower, toilet hygiene, dressing, functional mobility, community mobility, clothing management and household maintenance  Pt scored overall  70/100 on the Barthel Index  The patient's raw score on the AM-PAC Daily Activity inpatient short form is 19, standardized score is 40 22, greater than 39 4  Patients at this level are likely to benefit from D/C home with home therapy  Please refer to the recommendation of the Occupational Therapist for safe DC planning  Based on the aforementioned OT evaluation, functional performance deficits, and assessments, pt has been identified as a high complexity evaluation  From OT standpoint, anticipate d/c home with family support and home OT  Pt to continue to benefit from acute immediate OT services to address the following goals 3-5x/week to  w/in 7-10 days: Alfredito Rosenthal Goals   Patient Goals to go home   Plan   Treatment Interventions ADL retraining;Functional transfer training;UE strengthening/ROM; Endurance training;Patient/family training;Equipment evaluation/education; Compensatory technique education; Energy conservation; Activityengagement   Goal Expiration Date 21   OT Treatment Day 0   OT Frequency 3-5x/wk   Recommendation   OT Discharge Recommendation Home with skilled therapy   OT - OK to Discharge Yes   AM-PAC Daily Activity Inpatient   Lower Body Dressing 3   Bathing 3   Toileting 3   Upper Body Dressing 3   Grooming 3   Eating 4   Daily Activity Raw Score 19   Daily Activity Standardized Score (Calc for Raw Score >=11) 40 22   AM-PAC Applied Cognition Inpatient   Following a Speech/Presentation 4 Understanding Ordinary Conversation 4   Taking Medications 4   Remembering Where Things Are Placed or Put Away 4   Remembering List of 4-5 Errands 4   Taking Care of Complicated Tasks 4   Applied Cognition Raw Score 24   Applied Cognition Standardized Score 62 21   Barthel Index   Feeding 10   Bathing 0   Grooming Score 5   Dressing Score 5   Bladder Score 10   Bowels Score 10   Toilet Use Score 5   Transfers (Bed/Chair) Score 10   Mobility (Level Surface) Score 10   Stairs Score 5   Barthel Index Score 70      GOALS    1) Pt will increase activity tolerance to G for 30 min txment sessions    2) Pt will complete UB/LB dressing/self care w/ mod I using adaptive device and DME as needed    3) Pt will complete bathing w/ Mod I w/ use of AE and DME as needed    4) Pt will complete toileting w/ mod I w/ G hygiene/thoroughness using DME as needed    5) Pt will improve functional transfers to Mod I on/off all surfaces using DME as needed w/ G balance/safety     6) Pt will improve functional mobility during ADL/IADL/leisure tasks to Mod I using DME as needed w/ G balance/safety     7) Pt will participate in simulated IADL management task with DME as needed to increase independence to  w/ G safety and endurance    8) Pt will demonstrate G carryover of pt/caregiver education and training as appropriate  9) Pt will demonstrate 100% carryover of energy conservation techniques t/o functional I/ADL/leisure tasks w/o cues s/p skilled education      Adam Forman MOT,OTR/L

## 2021-03-30 NOTE — PROGRESS NOTES
Progress Note - Electrophysiology-Cardiology (EP)   Melisa Sweeney 80 y o  male MRN: [id number]  Unit/Bed#: [facility] 509-01 Encounter: [id number]      Assessment/Plan:   Primary diagnosis:   1  CHB     * s/p MDT DC PPM by Dr Sandra Farah on 3-29   * today his site is soft without any ecchymosis or hematoma    * device was interrogated and found to be in appropriate function    * CXRs reviewed without any PTX with proper lead placement    * we discussed proper post site care to which the patient understood, he was also given written instructions    * pt will f/u in our device clinic in 2 weeks time for device check, this appointment was placed in [facility]   * stable for discharge from EP perspective         Secondary diagnosis:   1  EKG:       Imaging: I have personally reviewed pertinent reports  Results for orders placed during the hospital encounter of 21   Echo complete with contrast if indicated    Narrative [address]  [facility] [place], [address] [facility]  [phone]    Transthoracic Echocardiogram  2D, M-mode, Doppler, and Color Doppler    Study date:  27-Mar-2021    Patient: Verito Lagos  MR number: [id number]  Account number: [de-identified]  : 1934  Age: 80 years  Gender: Male  Status: Inpatient  Location: Bedside  Height: 69 in  Weight: 279 lb  BP: 119/ 54 mmHg    Indications: Complete Heart Block, History of CABG    Diagnoses: I44 2 - Atrioventricular block, complete    Sonographer:  Nicolas Vale RDCS  Referring Physician:  NATHAN Lewis  Group:  [facility]  Interpreting Physician:  Marilyn Knowles MD    SUMMARY    LEFT VENTRICLE:  Systolic function was normal  Ejection fraction was estimated to be 60 %  There were no regional wall motion abnormalities  MITRAL VALVE:  There was mild annular calcification  There was mild regurgitation  TRICUSPID VALVE:  There was mild regurgitation      HISTORY: PRIOR HISTORY: Hypertension, Hyperlipidemia, JAMIE, CAD, TIA, SOB    PROCEDURE: The procedure was performed at the bedside  This was a routine study  The transthoracic approach was used  The study included complete 2D imaging, M-mode, complete spectral Doppler, and color Doppler  The heart rate was 68 bpm,  at the start of the study  Images were obtained from the parasternal, apical, subcostal, and suprasternal notch acoustic windows  Intravenous contrast ( 0 4ml Definity in NSS) was administered to opacify the left ventricle  Echocardiographic views were limited due to decreased penetration and lung interference  This was a technically difficult study  LEFT VENTRICLE: Size was normal  Systolic function was normal  Ejection fraction was estimated to be 60 %  There were no regional wall motion abnormalities  Wall thickness was normal  DOPPLER: The ratio of early ventricular filling to  atrial contraction velocities was within the normal range  Left ventricular diastolic function parameters were normal for the patient's age  RIGHT VENTRICLE: The size was normal  Systolic function was normal  Wall thickness was normal     LEFT ATRIUM: Size was normal     RIGHT ATRIUM: Size was normal     MITRAL VALVE: There was mild annular calcification  Valve structure was normal  There was normal leaflet separation  DOPPLER: The transmitral velocity was within the normal range  There was no evidence for stenosis  There was mild  regurgitation  AORTIC VALVE: The valve was trileaflet  Leaflets exhibited normal thickness, mild calcification, normal cuspal separation, and sclerosis  DOPPLER: Transaortic velocity was within the normal range  There was no evidence for stenosis  There  was trace regurgitation  TRICUSPID VALVE: The valve structure was normal  There was normal leaflet separation  DOPPLER: The transtricuspid velocity was within the normal range  There was no evidence for stenosis  There was mild regurgitation   Pulmonary artery  systolic pressure was within the normal range  Estimated peak PA pressure was 20 mmHg  PULMONIC VALVE: Leaflets exhibited normal thickness, no calcification, and normal cuspal separation  DOPPLER: The transpulmonic velocity was within the normal range  There was trace regurgitation  PERICARDIUM: There was no pericardial effusion  The pericardium was normal in appearance  AORTA: The root exhibited normal size  SYSTEMIC VEINS: IVC: The inferior vena cava was dilated  Respirophasic changes were normal     SYSTEM MEASUREMENT TABLES    2D  %FS: 25 25 %  Ao Diam: 3 52 cm  Ao asc: 3 63 cm  EDV(Teich): 98 47 ml  EF(Teich): 49 9 %  ESV(Teich): 49 33 ml  IVSd: 0 91 cm  LA Diam: 4 32 cm  LAAs A4C: 24 75 cm2  LAESV A-L A4C: 91 43 ml  LAESV MOD A4C: 86 97 ml  LALs A4C: 5 69 cm  LVEDV MOD A4C: 83 69 ml  LVEF MOD A4C: 65 65 %  LVESV MOD A4C: 28 75 ml  LVIDd: 4 62 cm  LVIDs: 3 46 cm  LVLd A4C: 6 37 cm  LVLs A4C: 5 88 cm  LVPWd: 1 13 cm  RAESV A-L: 49 58 ml  RAESV MOD: 45 87 ml  RALs: 5 01 cm  RVIDd: 4 05 cm  SV MOD A4C: 54 94 ml  SV(Teich): 49 14 ml    CW  AV Env  Ti: 281 64 ms  AV VTI: 24 4 cm  AV Vmax: 1 28 m/s  AV Vmean: 0 87 m/s  AV maxP 56 mmHg  AV meanPG: 3 46 mmHg  TR Vmax: 2 84 m/s  TR maxP 36 mmHg    MM  TAPSE: 2 27 cm    PW  E' Sept: 0 08 m/s  LVOT Env  Ti: 342 53 ms  LVOT VTI: 28 01 cm  LVOT Vmax: 1 25 m/s  LVOT Vmean: 0 82 m/s  LVOT maxP 23 mmHg  LVOT meanPG: 3 18 mmHg    [employer] Accredited Echocardiography Laboratory    Prepared and electronically signed by    Daisy Johnson MD  Signed 27-Mar-2021 14:02:20         Subjective/Objective   Subjective: today patient is followed post PPM implant  He has no concerns or complaints       Vitals: /77   Pulse 78   Temp 97 8 °F (36 6 °C)   Resp 20   Wt 113 kg (250 lb 3 6 oz)   SpO2 95%   BMI 36 95 kg/m²   Vitals:    21 0600 21 0600   Weight: 114 kg (251 lb 15 8 oz) 113 kg (250 lb 3 6 oz)     Orthostatic Blood Pressures Most Recent Value   Blood Pressure  151/77 filed at 03/30/2021 6742   Patient Position - Orthostatic VS  Sitting filed at 03/29/2021 0736            Intake/Output Summary (Last 24 hours) at 3/30/2021 1646  Last data filed at 3/30/2021 1101  Gross per 24 hour   Intake 120 ml   Output 500 ml   Net -380 ml       Invasive Devices     None                             Scheduled Meds:  Continuous Infusions:No current facility-administered medications for this encounter  PRN Meds:     Physical Exam:   GEN: NAD, alert and oriented, well appearing  SKIN: dry without significant lesions or rashes  HEENT: NCAT, PERRL, EOMs intact  NECK: No JVD or carotid bruits appreciated  CARDIOVASCULAR: RRR, normal S1, S2 without murmurs, rubs, or gallops appreciated, pacer site CDI, no ecchymosis   LUNGS: Clear to auscultation bilaterally without wheezes, rhonchi, or rales  ABDOMEN: Soft, nontender, nondistended  EXTREMITIES/VASCULAR: perfused without clubbing, cyanosis, or edema b/l  PSYCH: Normal mood and affect  NEURO: CN ll-Xll grossly intact                Lab Results: I have personally reviewed pertinent lab results      Results from last 7 days   Lab Units 03/30/21  0511 03/29/21  0451 03/28/21  0432   WBC Thousand/uL 9 58 8 49 9 86   HEMOGLOBIN g/dL 15 1 14 6 13 9   HEMATOCRIT % 44 9 43 8 42 7   PLATELETS Thousands/uL 196 185 172     Results from last 7 days   Lab Units 03/30/21  0511 03/29/21  0451 03/28/21  0432   POTASSIUM mmol/L 4 4 4 2 4 4   CHLORIDE mmol/L 106 106 110*   CO2 mmol/L 28 25 26   BUN mg/dL 23 28* 36*   CREATININE mg/dL 1 07 1 09 1 46*   CALCIUM mg/dL 8 8 8 7 8 8     Results from last 7 days   Lab Units 03/26/21  1408   INR  1 22*     Results from last 7 days   Lab Units 03/28/21  0432 03/27/21  0525 03/26/21  1837   MAGNESIUM mg/dL 2 3 2 3 2 3

## 2021-03-30 NOTE — CASE MANAGEMENT
Met with patient and son to discuss recommendation for [facility] and patient declines this

## 2021-03-31 ENCOUNTER — HOSPITAL ENCOUNTER (OUTPATIENT)
Dept: RADIOLOGY | Facility: HOSPITAL | Age: 86
Discharge: HOME | End: 2021-03-31

## 2021-03-31 DIAGNOSIS — R06.02 SOB (SHORTNESS OF BREATH): ICD-10-CM

## 2021-03-31 LAB
ATRIAL RATE: 73 BPM
P AXIS: 64 DEGREES
PR INTERVAL: 212 MS
QRS AXIS: 140 DEGREES
QRSD INTERVAL: 158 MS
QT INTERVAL: 452 MS
QTC INTERVAL: 497 MS
T WAVE AXIS: 256 DEGREES
VENTRICULAR RATE: 73 BPM

## 2021-03-31 PROCEDURE — 93010 ELECTROCARDIOGRAM REPORT: CPT | Performed by: INTERNAL MEDICINE

## 2021-04-01 ENCOUNTER — IMMUNIZATIONS (OUTPATIENT)
Dept: FAMILY MEDICINE CLINIC | Facility: HOSPITAL | Age: 86
End: 2021-04-01

## 2021-04-01 DIAGNOSIS — Z23 ENCOUNTER FOR IMMUNIZATION: Primary | ICD-10-CM

## 2021-04-01 PROCEDURE — 0011A SARS-COV-2 / COVID-19 MRNA VACCINE (MODERNA) 100 MCG: CPT

## 2021-04-01 PROCEDURE — 91301 SARS-COV-2 / COVID-19 MRNA VACCINE (MODERNA) 100 MCG: CPT

## 2021-04-06 ENCOUNTER — OFFICE VISIT (OUTPATIENT)
Dept: CARDIOLOGY CLINIC | Facility: CLINIC | Age: 86
End: 2021-04-06

## 2021-04-06 DIAGNOSIS — I44.2 COMPLETE HEART BLOCK (HCC): Primary | ICD-10-CM

## 2021-04-06 PROCEDURE — 99024 POSTOP FOLLOW-UP VISIT: CPT | Performed by: INTERNAL MEDICINE

## 2021-04-06 NOTE — PROGRESS NOTES
Patient presents for wound check  He had a dual chamber Medtronic pacemaker placed 03/29/2021 for complete heart block  Denies fever, chills  Bandage intact  Bandage removed  Staples removed  L subclavian incision well approximated  No drainage, bleeding, erythema, edema, ecchymosis  Pt to call with any s/s infection-reviewed with pt and spouse

## 2021-04-26 ENCOUNTER — OFFICE VISIT (OUTPATIENT)
Dept: CARDIOLOGY CLINIC | Facility: CLINIC | Age: 86
End: 2021-04-26
Payer: COMMERCIAL

## 2021-04-26 ENCOUNTER — IN-CLINIC DEVICE VISIT (OUTPATIENT)
Dept: CARDIOLOGY CLINIC | Facility: CLINIC | Age: 86
End: 2021-04-26
Payer: COMMERCIAL

## 2021-04-26 VITALS
HEIGHT: 68 IN | DIASTOLIC BLOOD PRESSURE: 60 MMHG | SYSTOLIC BLOOD PRESSURE: 140 MMHG | WEIGHT: 247 LBS | HEART RATE: 62 BPM | BODY MASS INDEX: 37.44 KG/M2

## 2021-04-26 DIAGNOSIS — Z45.010 ENCOUNTER FOR CHECKING AND TESTING OF CARDIAC PACEMAKER PULSE GENERATOR (BATTERY): ICD-10-CM

## 2021-04-26 DIAGNOSIS — E78.2 MIXED HYPERLIPIDEMIA: Primary | Chronic | ICD-10-CM

## 2021-04-26 DIAGNOSIS — I49.5 SICK SINUS SYNDROME (HCC): Primary | ICD-10-CM

## 2021-04-26 DIAGNOSIS — I25.708 CORONARY ARTERY DISEASE OF BYPASS GRAFT OF NATIVE HEART WITH STABLE ANGINA PECTORIS (HCC): Chronic | ICD-10-CM

## 2021-04-26 DIAGNOSIS — I44.2 COMPLETE HEART BLOCK (HCC): ICD-10-CM

## 2021-04-26 PROCEDURE — 99214 OFFICE O/P EST MOD 30 MIN: CPT | Performed by: INTERNAL MEDICINE

## 2021-04-26 PROCEDURE — 93280 PM DEVICE PROGR EVAL DUAL: CPT | Performed by: INTERNAL MEDICINE

## 2021-04-26 RX ORDER — POTASSIUM CHLORIDE 750 MG/1
10 TABLET, EXTENDED RELEASE ORAL AS NEEDED
Qty: 90 TABLET | Refills: 5
Start: 2021-04-26

## 2021-04-26 RX ORDER — ASPIRIN 81 MG/1
81 TABLET, CHEWABLE ORAL DAILY
Start: 2021-04-26

## 2021-04-26 RX ORDER — FUROSEMIDE 40 MG/1
40 TABLET ORAL AS NEEDED
Start: 2021-04-26

## 2021-04-26 NOTE — PATIENT INSTRUCTIONS
Change aspirin to 81 mg daily  Take 40 mg of furosemide on any morning that the weight is over 245 lb  Take potassium once daily for any morning that the weight is over 245 lb  If the weight is 245 or below do not take any furosemide or potassium on that day  When running out of furosemide, call us so we can send the higher dose pill to the pharmacy

## 2021-04-26 NOTE — PROGRESS NOTES
Patient ID: Chata Nye is a 80 y o  male  Plan:      Coronary artery disease of bypass graft of native heart with stable angina pectoris ([facility] [address] )  No recent angina  Patient had heart bypass surgery twice the most recent 1 being 1989  Complete heart block ([facility] [address] )  Dual-chamber Medtronic pacemaker placed on 03/29/21  Hyperlipidemia  Tolerating high-intensity statin therapy and will continue current regimen  Follow up Plan/Other summary comments:  One year EKG and follow-up visit  I gave the patient the following instructions:  Change aspirin to 81 mg daily  Take 40 mg of furosemide on any morning that the weight is over 245 lb  Take potassium once daily for any morning that the weight is over 245 lb  If the weight is 245 or below do not take any furosemide or potassium on that day  HPI:  Patient is seen in follow-up today regarding the above issues  He had presented with complete heart block and had a pacemaker placed  Since hospital discharge she has felt well  Patient has had heart bypass surgery twice the most recent time being in 1989  Heart catheterization in 2018 revealed patent mammary graft to the LAD and patent free radial graft from the mammary artery to the distal LAD, obtuse marginal 1, and posterior descending artery  Low there has been some further angina over the years none recent  A dual-chamber Medtronic pacemaker was placed on 03/29/2021  He has also had a prior left carotid endarterectomy  Most recent or relevant cardiac/vascular testing:    TTE 03/27/2021:  Normal EF    Normal       Past Surgical History:   Procedure Laterality Date    CARDIAC CATHETERIZATION  11/15/2018    medical therapy; cath in care everywhere from [place] [id number]  09/2001    SVG-OM3 stents    CORONARY ARTERY BYPASS GRAFT  1982    Free radial graft to D1/D2/OM2, LIMA to LAD SVG - OM3    CORONARY ARTERY BYPASS GRAFT  1989    Re-do CABG CMP:   Lab Results   Component Value Date    K 4 4 03/30/2021     03/30/2021    CO2 28 03/30/2021    BUN 23 03/30/2021    CREATININE 1 07 03/30/2021    EGFR 63 03/30/2021       Lipid Profile:   Lab Results   Component Value Date    TRIG 91 03/27/2021    HDL 37 (L) 03/27/2021         Review of Systems   10  point ROS  was otherwise non pertinent or negative except as per HPI or as below  Gait: Normal         Objective:     /60   Pulse 62   Ht 5' 8" (1 727 m)   Wt 112 kg (247 lb)   BMI 37 56 kg/m²     PHYSICAL EXAM:    General:  Normal appearance in no distress  Eyes:  Anicteric  Oral mucosa:  Moist   Neck:  No JVD  Carotid upstrokes are brisk without bruits  No masses  Left carotid endarterectomy scar  Chest:  Clear to auscultation and percussion  Pacemaker left subclavian region well-healed  Cardiac:  No palpable PMI  Normal S1 and S2  No murmur gallop or rub  Abdomen:  Soft and nontender  No palpable organomegaly or aortic enlargement  Extremities:  Trace peripheral edema  Musculoskeletal:  Symmetric  Vascular:  Femoral pulses are brisk without bruits  Popliteal pulses are intact bilaterally  Pedal pulses are diminished  Neuro:  Grossly symmetric  Psych:  Alert and oriented x3 all          Current Outpatient Medications:     allopurinol (ZYLOPRIM) 300 mg tablet, Take 300 mg by mouth daily, Disp: , Rfl:     amLODIPine (NORVASC) 5 mg tablet, Take 5 mg by mouth daily, Disp: , Rfl:     benazepril (LOTENSIN) 10 mg tablet, Take 10 mg by mouth, Disp: , Rfl:     furosemide (LASIX) 40 mg tablet, Take 1 tablet (40 mg total) by mouth as needed (Only take when the morning weight is greater than 245 lb ), Disp: , Rfl:     isosorbide mononitrate (IMDUR) 60 mg 24 hr tablet, Take 90 mg by mouth daily, Disp: , Rfl:     latanoprost (XALATAN) 0 005 % ophthalmic solution, Apply 1 drop to eye, Disp: , Rfl:     multivitamin (THERAGRAN) TABS, Take 1 tablet by mouth daily, Disp: , Rfl:    omeprazole (PriLOSEC OTC) 20 MG tablet, Take 20 mg by mouth daily, Disp: , Rfl:     potassium chloride (K-DUR,KLOR-CON) 10 mEq tablet, Take 1 tablet (10 mEq total) by mouth as needed (Morning weight more than 245 lb ), Disp: 90 tablet, Rfl: 5    rosuvastatin (CRESTOR) 20 MG tablet, Take 20 mg by mouth daily, Disp: , Rfl:     aspirin 81 mg chewable tablet, Chew 1 tablet (81 mg total) daily, Disp: , Rfl:   Allergies   Allergen Reactions    Metoprolol Shortness Of Breath     Past Medical History:   Diagnosis Date    CAD (coronary artery disease)     Free radial graft to D1/D2/OM2, LIMA to LAD SVG - OM3 (1st CABG 1982 and redo 1989); SVG-OM3 9/2001 stents    CHB (complete heart block) ([facility] [address] ) 03/29/2021    dual chamber Medtonic pacemaker    Hyperlipidemia     Hypertension            Social History     Tobacco Use   Smoking Status Never Smoker   Smokeless Tobacco Never Used

## 2021-04-28 NOTE — PROGRESS NOTES
Device check in person pacer  No events  Normal battery function        Current Outpatient Medications:     allopurinol (ZYLOPRIM) 300 mg tablet, Take 300 mg by mouth daily, Disp: , Rfl:     amLODIPine (NORVASC) 5 mg tablet, Take 5 mg by mouth daily, Disp: , Rfl:     aspirin 81 mg chewable tablet, Chew 1 tablet (81 mg total) daily, Disp: , Rfl:     benazepril (LOTENSIN) 10 mg tablet, Take 10 mg by mouth, Disp: , Rfl:     furosemide (LASIX) 40 mg tablet, Take 1 tablet (40 mg total) by mouth as needed (Only take when the morning weight is greater than 245 lb ), Disp: , Rfl:     isosorbide mononitrate (IMDUR) 60 mg 24 hr tablet, Take 90 mg by mouth daily, Disp: , Rfl:     latanoprost (XALATAN) 0 005 % ophthalmic solution, Apply 1 drop to eye, Disp: , Rfl:     multivitamin (THERAGRAN) TABS, Take 1 tablet by mouth daily, Disp: , Rfl:     omeprazole (PriLOSEC OTC) 20 MG tablet, Take 20 mg by mouth daily, Disp: , Rfl:     potassium chloride (K-DUR,KLOR-CON) 10 mEq tablet, Take 1 tablet (10 mEq total) by mouth as needed (Morning weight more than 245 lb ), Disp: 90 tablet, Rfl: 5    rosuvastatin (CRESTOR) 20 MG tablet, Take 20 mg by mouth daily, Disp: , Rfl:

## 2021-04-29 ENCOUNTER — IMMUNIZATIONS (OUTPATIENT)
Dept: FAMILY MEDICINE CLINIC | Facility: HOSPITAL | Age: 86
End: 2021-04-29

## 2021-04-29 DIAGNOSIS — Z23 ENCOUNTER FOR IMMUNIZATION: Primary | ICD-10-CM

## 2021-04-29 PROCEDURE — 0012A SARS-COV-2 / COVID-19 MRNA VACCINE (MODERNA) 100 MCG: CPT

## 2021-04-29 PROCEDURE — 91301 SARS-COV-2 / COVID-19 MRNA VACCINE (MODERNA) 100 MCG: CPT

## 2021-12-21 PROCEDURE — 0241U HB NFCT DS VIR RESP RNA 4 TRGT: CPT | Performed by: INTERNAL MEDICINE

## 2022-12-02 ENCOUNTER — APPOINTMENT (OUTPATIENT)
Dept: RADIOLOGY | Facility: CLINIC | Age: 87
End: 2022-12-02

## 2022-12-02 ENCOUNTER — OFFICE VISIT (OUTPATIENT)
Dept: URGENT CARE | Facility: CLINIC | Age: 87
End: 2022-12-02

## 2022-12-02 VITALS — RESPIRATION RATE: 18 BRPM | HEART RATE: 89 BPM | OXYGEN SATURATION: 98 % | TEMPERATURE: 97.8 F

## 2022-12-02 DIAGNOSIS — J20.8 ACUTE BACTERIAL BRONCHITIS: Primary | ICD-10-CM

## 2022-12-02 DIAGNOSIS — R05.1 ACUTE COUGH: ICD-10-CM

## 2022-12-02 DIAGNOSIS — B96.89 ACUTE BACTERIAL BRONCHITIS: Primary | ICD-10-CM

## 2022-12-02 LAB
SARS-COV-2 AG UPPER RESP QL IA: NEGATIVE
VALID CONTROL: NORMAL

## 2022-12-02 RX ORDER — DOXYCYCLINE 100 MG/1
100 TABLET ORAL 2 TIMES DAILY
Qty: 14 TABLET | Refills: 0 | Status: SHIPPED | OUTPATIENT
Start: 2022-12-02 | End: 2022-12-09

## 2022-12-02 NOTE — PROGRESS NOTES
[facility]        NAME: Juana Thorpe is a 80 y o  male  : 1934    MRN: [id number]  DATE: 2022  TIME: 2:28 PM    Assessment and Plan   Acute bacterial bronchitis [J20 8, B96 89]  1  Acute bacterial bronchitis  XR chest pa & lateral    Poct Covid 19 Rapid Antigen Test    doxycycline (ADOXA) 100 MG tablet            Patient Instructions     Patient Instructions     Start doxycycline  Mucinex over-the-counter for cough and congestion  Tylenol and Motrin over-the-counter for pain and fever  Hydration and rest   Follow up with PCP  Go to the nearest emergency department if you have difficulty breathing, worsening symptoms  Acute Bronchitis   WHAT YOU NEED TO KNOW:   Acute bronchitis is swelling and irritation in your lungs  It is usually caused by a virus and most often happens in the winter  Bronchitis may also be caused by bacteria or by a chemical irritant, such as smoke  DISCHARGE INSTRUCTIONS:   Return to the emergency department if:   · You cough up blood  · Your lips or fingernails turn blue  · You feel like you are not getting enough air when you breathe  Call your doctor if:   · Your symptoms do not go away or get worse, even after treatment  · Your cough does not get better within 4 weeks  · You have questions or concerns about your condition or care  Medicines: You may  need any of the following:  · Cough suppressants  decrease your urge to cough  · Decongestants  help loosen mucus in your lungs and make it easier to cough up  This can help you breathe easier  · Inhalers  may be given  Your healthcare provider may give you one or more inhalers to help you breathe easier and cough less  An inhaler gives your medicine to open your airways  Ask your healthcare provider to show you how to use your inhaler correctly  · Acetaminophen  decreases pain and fever  It is available without a doctor's order   Ask how much to take and how often to take it  Follow directions  Read the labels of all other medicines you are using to see if they also contain acetaminophen, or ask your doctor or pharmacist  Acetaminophen can cause liver damage if not taken correctly  Do not use more than 4 grams (4,000 milligrams) total of acetaminophen in one day  · NSAIDs  help decrease swelling and pain or fever  This medicine is available with or without a doctor's order  NSAIDs can cause stomach bleeding or kidney problems in certain people  If you take blood thinner medicine, always ask your healthcare provider if NSAIDs are safe for you  Always read the medicine label and follow directions  · Take your medicine as directed  Contact your healthcare provider if you think your medicine is not helping or if you have side effects  Tell him of her if you are allergic to any medicine  Keep a list of the medicines, vitamins, and herbs you take  Include the amounts, and when and why you take them  Bring the list or the pill bottles to follow-up visits  Carry your medicine list with you in case of an emergency  Self-care:   · Drink liquids as directed  You may need to drink more liquids than usual to stay hydrated  Ask how much liquid to drink each day and which liquids are best for you  · Use a cool mist humidifier  to increase air moisture in your home  This may make it easier for you to breathe and help decrease your cough  · Get more rest   Rest helps your body to heal  Slowly start to do more each day  Rest when you feel it is needed  · Avoid irritants in the air  Avoid chemicals, fumes, and dust  Wear a face mask if you must work around dust or fumes  Stay inside on days when air pollution levels are high  If you have allergies, stay inside when pollen counts are high  Do not use aerosol products, such as spray-on deodorant, bug spray, and hair spray  · Do not smoke or be around others who are smoking    Nicotine and other chemicals in cigarettes and cigars can cause lung damage  Ask your healthcare provider for information if you currently smoke and need help to quit  E-cigarettes or smokeless tobacco still contain nicotine  Talk to your healthcare provider before you use these products  Prevent acute bronchitis:       · Get the vaccinations you need  Ask your healthcare provider if you should get the flu or pneumonia vaccines  · Prevent the spread of germs  You can decrease your risk for acute bronchitis and other illnesses by doing the following:     ? Wash your hands often with soap and water  Carry germ-killing hand lotion or gel with you  You can use the lotion or gel to clean your hands when soap and water are not available  ? Do not touch your eyes, nose, or mouth unless you have washed your hands first     ? Always cover your mouth when you cough to prevent the spread of germs  It is best to cough into a tissue or your shirt sleeve instead of into your hand  Ask those around you to cover their mouths when they cough  ? Try to avoid people who have a cold or the flu  If you are sick, stay away from others as much as possible  Follow up with your doctor as directed:  Write down questions you have so you will remember to ask them during your follow-up visits  © Copyright [address] Information is for End User's use only and may not be sold, redistributed or otherwise used for commercial purposes  All illustrations and images included in CareNotes® are the copyrighted property of [employer]  or [facility] Edis Murillo   The above information is an  only  It is not intended as medical advice for individual conditions or treatments  Talk to your doctor, nurse or pharmacist before following any medical regimen to see if it is safe and effective for you  **Portions of the record may have been created with voice recognition software    Occasional wrong word or "sound a like" substitutions may have occurred due to the inherent limitations of voice recognition software  Read the chart carefully and recognize, using context, where substitutions have occurred  **     Chief Complaint     Chief Complaint   Patient presents with   • Cough     Coughing up little specks for 1 week         History of Present Illness     Pollo Darden is a 80 y o  male presents to clinic today with complaints of productive cough x 1 week  Reports coughing up grey phlegm  He feels short of breath but denies dyspnea on exertion, pleuritic chest pain, fever, body aches, chest pain, n/v/d  No known sick contacts  Taking otc mucinex without any relief  Review of Systems     Review of Systems   Constitutional: Positive for fatigue  Negative for appetite change, chills and fever  HENT: Positive for rhinorrhea  Negative for congestion, ear discharge, ear pain, facial swelling, mouth sores, postnasal drip, sinus pressure, sinus pain and sore throat  Eyes: Negative for discharge and redness  Respiratory: Positive for cough and shortness of breath  Cardiovascular: Negative for chest pain  Gastrointestinal: Negative for diarrhea, nausea and vomiting  Musculoskeletal: Negative for myalgias  Skin: Negative for rash  Neurological: Negative for dizziness and headaches           Current Medications     Current Outpatient Medications:   •  doxycycline (ADOXA) 100 MG tablet, Take 1 tablet (100 mg total) by mouth 2 (two) times a day for 7 days, Disp: 14 tablet, Rfl: 0  •  allopurinol (ZYLOPRIM) 300 mg tablet, Take 300 mg by mouth daily, Disp: , Rfl:   •  amLODIPine (NORVASC) 5 mg tablet, Take 5 mg by mouth daily, Disp: , Rfl:   •  aspirin 81 mg chewable tablet, Chew 1 tablet (81 mg total) daily, Disp: , Rfl:   •  benazepril (LOTENSIN) 10 mg tablet, Take 10 mg by mouth, Disp: , Rfl:   •  furosemide (LASIX) 40 mg tablet, Take 1 tablet (40 mg total) by mouth as needed (Only take when the morning weight is greater than 245 lb ), Disp: , Rfl: •  isosorbide mononitrate (IMDUR) 60 mg 24 hr tablet, Take 90 mg by mouth daily, Disp: , Rfl:   •  latanoprost (XALATAN) 0 005 % ophthalmic solution, Apply 1 drop to eye, Disp: , Rfl:   •  multivitamin (THERAGRAN) TABS, Take 1 tablet by mouth daily, Disp: , Rfl:   •  omeprazole (PriLOSEC OTC) 20 MG tablet, Take 20 mg by mouth daily, Disp: , Rfl:   •  potassium chloride (K-DUR,KLOR-CON) 10 mEq tablet, Take 1 tablet (10 mEq total) by mouth as needed (Morning weight more than 245 lb ), Disp: 90 tablet, Rfl: 5  •  rosuvastatin (CRESTOR) 20 MG tablet, Take 20 mg by mouth daily, Disp: , Rfl:     Current Allergies     Allergies as of 12/02/2022 - Reviewed 12/02/2022   Allergen Reaction Noted   • Metoprolol Shortness Of Breath 11/14/2018            The following portions of the patient's history were reviewed and updated as appropriate: allergies, current medications, past family history, past medical history, past social history, past surgical history and problem list      Past Medical History:   Diagnosis Date   • CAD (coronary artery disease)     Free radial graft to D1/D2/OM2, LIMA to LAD SVG - OM3 (1st CABG 1982 and redo 1989); SVG-OM3 9/2001 stents   • CHB (complete heart block) ([facility] [address] ) 03/29/2021    dual chamber Medtonic pacemaker   • Hyperlipidemia    • Hypertension        Past Surgical History:   Procedure Laterality Date   • CARDIAC CATHETERIZATION  11/15/2018    medical therapy; cath in care everywhere from [facility]   • CORONARY ANGIOPLASTY WITH STENT PLACEMENT  09/2001    SVG-OM3 stents   • CORONARY ARTERY BYPASS GRAFT  1982    Free radial graft to D1/D2/OM2, LIMA to LAD SVG - OM3   • CORONARY ARTERY BYPASS GRAFT  1989    Re-do CABG       No family history on file  Medications have been verified  Objective     Pulse 89   Temp 97 8 °F (36 6 °C)   Resp 18   SpO2 98%        Physical Exam     Physical Exam  Vitals and nursing note reviewed  Constitutional:       General: He is not in acute distress  Appearance: Normal appearance  He is not ill-appearing  HENT:      Head: Normocephalic and atraumatic  Right Ear: There is impacted cerumen  Left Ear: There is impacted cerumen  Nose: Congestion and rhinorrhea present  Mouth/Throat:      Mouth: Mucous membranes are moist       Pharynx: Oropharynx is clear  No posterior oropharyngeal erythema  Cardiovascular:      Rate and Rhythm: Normal rate and regular rhythm  Pulses: Normal pulses  Heart sounds: Normal heart sounds  Pulmonary:      Effort: Pulmonary effort is normal  No respiratory distress  Breath sounds: No stridor  Decreased breath sounds and wheezing present  No rhonchi or rales  Lymphadenopathy:      Cervical: No cervical adenopathy  Skin:     General: Skin is warm and dry  Findings: No rash  Neurological:      Mental Status: He is alert

## 2022-12-02 NOTE — PATIENT INSTRUCTIONS
Start doxycycline  Mucinex over-the-counter for cough and congestion  Tylenol and Motrin over-the-counter for pain and fever  Hydration and rest   Follow up with PCP  Go to the nearest emergency department if you have difficulty breathing, worsening symptoms  Acute Bronchitis   WHAT YOU NEED TO KNOW:   Acute bronchitis is swelling and irritation in your lungs  It is usually caused by a virus and most often happens in the winter  Bronchitis may also be caused by bacteria or by a chemical irritant, such as smoke  DISCHARGE INSTRUCTIONS:   Return to the emergency department if:   You cough up blood  Your lips or fingernails turn blue  You feel like you are not getting enough air when you breathe  Call your doctor if:   Your symptoms do not go away or get worse, even after treatment  Your cough does not get better within 4 weeks  You have questions or concerns about your condition or care  Medicines: You may  need any of the following:  Cough suppressants  decrease your urge to cough  Decongestants  help loosen mucus in your lungs and make it easier to cough up  This can help you breathe easier  Inhalers  may be given  Your healthcare provider may give you one or more inhalers to help you breathe easier and cough less  An inhaler gives your medicine to open your airways  Ask your healthcare provider to show you how to use your inhaler correctly  Acetaminophen  decreases pain and fever  It is available without a doctor's order  Ask how much to take and how often to take it  Follow directions  Read the labels of all other medicines you are using to see if they also contain acetaminophen, or ask your doctor or pharmacist  Acetaminophen can cause liver damage if not taken correctly  Do not use more than 4 grams (4,000 milligrams) total of acetaminophen in one day  NSAIDs  help decrease swelling and pain or fever  This medicine is available with or without a doctor's order  NSAIDs can cause stomach bleeding or kidney problems in certain people  If you take blood thinner medicine, always ask your healthcare provider if NSAIDs are safe for you  Always read the medicine label and follow directions  Take your medicine as directed  Contact your healthcare provider if you think your medicine is not helping or if you have side effects  Tell him of her if you are allergic to any medicine  Keep a list of the medicines, vitamins, and herbs you take  Include the amounts, and when and why you take them  Bring the list or the pill bottles to follow-up visits  Carry your medicine list with you in case of an emergency  Self-care:   Drink liquids as directed  You may need to drink more liquids than usual to stay hydrated  Ask how much liquid to drink each day and which liquids are best for you  Use a cool mist humidifier  to increase air moisture in your home  This may make it easier for you to breathe and help decrease your cough  Get more rest   Rest helps your body to heal  Slowly start to do more each day  Rest when you feel it is needed  Avoid irritants in the air  Avoid chemicals, fumes, and dust  Wear a face mask if you must work around dust or fumes  Stay inside on days when air pollution levels are high  If you have allergies, stay inside when pollen counts are high  Do not use aerosol products, such as spray-on deodorant, bug spray, and hair spray  Do not smoke or be around others who are smoking  Nicotine and other chemicals in cigarettes and cigars can cause lung damage  Ask your healthcare provider for information if you currently smoke and need help to quit  E-cigarettes or smokeless tobacco still contain nicotine  Talk to your healthcare provider before you use these products  Prevent acute bronchitis:       Get the vaccinations you need  Ask your healthcare provider if you should get the flu or pneumonia vaccines  Prevent the spread of germs    You can decrease your risk for acute bronchitis and other illnesses by doing the following:     Wash your hands often with soap and water  Carry germ-killing hand lotion or gel with you  You can use the lotion or gel to clean your hands when soap and water are not available  Do not touch your eyes, nose, or mouth unless you have washed your hands first     Always cover your mouth when you cough to prevent the spread of germs  It is best to cough into a tissue or your shirt sleeve instead of into your hand  Ask those around you to cover their mouths when they cough  Try to avoid people who have a cold or the flu  If you are sick, stay away from others as much as possible  Follow up with your doctor as directed:  Write down questions you have so you will remember to ask them during your follow-up visits  © Copyright [address] Information is for End User's use only and may not be sold, redistributed or otherwise used for commercial purposes  All illustrations and images included in CareNotes® are the copyrighted property of [employer]  or [facility] Edis Trujillo  The above information is an  only  It is not intended as medical advice for individual conditions or treatments  Talk to your doctor, nurse or pharmacist before following any medical regimen to see if it is safe and effective for you

## 2024-09-06 ENCOUNTER — APPOINTMENT (EMERGENCY)
Dept: RADIOLOGY | Facility: HOSPITAL | Age: 89
End: 2024-09-06
Payer: COMMERCIAL

## 2024-09-06 ENCOUNTER — OFFICE VISIT (OUTPATIENT)
Dept: URGENT CARE | Facility: CLINIC | Age: 89
End: 2024-09-06
Payer: COMMERCIAL

## 2024-09-06 ENCOUNTER — HOSPITAL ENCOUNTER (EMERGENCY)
Facility: HOSPITAL | Age: 89
Discharge: HOME | End: 2024-09-06
Attending: EMERGENCY MEDICINE
Payer: COMMERCIAL

## 2024-09-06 VITALS
BODY MASS INDEX: 33.33 KG/M2 | RESPIRATION RATE: 22 BRPM | TEMPERATURE: 97.8 F | OXYGEN SATURATION: 99 % | DIASTOLIC BLOOD PRESSURE: 60 MMHG | SYSTOLIC BLOOD PRESSURE: 104 MMHG | WEIGHT: 225 LBS | HEIGHT: 69 IN | HEART RATE: 76 BPM

## 2024-09-06 VITALS
RESPIRATION RATE: 16 BRPM | HEART RATE: 60 BPM | DIASTOLIC BLOOD PRESSURE: 58 MMHG | OXYGEN SATURATION: 93 % | SYSTOLIC BLOOD PRESSURE: 118 MMHG | TEMPERATURE: 97.8 F

## 2024-09-06 DIAGNOSIS — I10 PRIMARY HYPERTENSION: Chronic | ICD-10-CM

## 2024-09-06 DIAGNOSIS — R53.83 FATIGUE: Primary | ICD-10-CM

## 2024-09-06 DIAGNOSIS — R06.02 SOB (SHORTNESS OF BREATH): Primary | ICD-10-CM

## 2024-09-06 LAB
ALBUMIN SERPL BCG-MCNC: 3.5 G/DL (ref 3.5–5)
ALP SERPL-CCNC: 43 U/L (ref 34–104)
ALT SERPL W P-5'-P-CCNC: 22 U/L (ref 7–52)
ANION GAP SERPL CALCULATED.3IONS-SCNC: 10 MMOL/L (ref 4–13)
APTT PPP: 29 SECONDS (ref 23–34)
AST SERPL W P-5'-P-CCNC: 33 U/L (ref 13–39)
BASOPHILS # BLD AUTO: 0.06 THOUSANDS/ÂΜL (ref 0–0.1)
BASOPHILS NFR BLD AUTO: 1 % (ref 0–1)
BILIRUB SERPL-MCNC: 0.49 MG/DL (ref 0.2–1)
BNP SERPL-MCNC: 128 PG/ML (ref 0–100)
BUN SERPL-MCNC: 21 MG/DL (ref 5–25)
CALCIUM SERPL-MCNC: 9 MG/DL (ref 8.4–10.2)
CARDIAC TROPONIN I PNL SERPL HS: 4 NG/L
CHLORIDE SERPL-SCNC: 104 MMOL/L (ref 96–108)
CO2 SERPL-SCNC: 26 MMOL/L (ref 21–32)
CREAT SERPL-MCNC: 1.13 MG/DL (ref 0.6–1.3)
EOSINOPHIL # BLD AUTO: 0.44 THOUSAND/ÂΜL (ref 0–0.61)
EOSINOPHIL NFR BLD AUTO: 5 % (ref 0–6)
ERYTHROCYTE [DISTWIDTH] IN BLOOD BY AUTOMATED COUNT: 13.2 % (ref 11.6–15.1)
FLUAV RNA RESP QL NAA+PROBE: NEGATIVE
FLUBV RNA RESP QL NAA+PROBE: NEGATIVE
GFR SERPL CREATININE-BSD FRML MDRD: 57 ML/MIN/1.73SQ M
GLUCOSE SERPL-MCNC: 125 MG/DL (ref 65–140)
HCT VFR BLD AUTO: 41.7 % (ref 36.5–49.3)
HGB BLD-MCNC: 13.8 G/DL (ref 12–17)
IMM GRANULOCYTES # BLD AUTO: 0.02 THOUSAND/UL (ref 0–0.2)
IMM GRANULOCYTES NFR BLD AUTO: 0 % (ref 0–2)
INR PPP: 1.02 (ref 0.85–1.19)
LYMPHOCYTES # BLD AUTO: 2.37 THOUSANDS/ÂΜL (ref 0.6–4.47)
LYMPHOCYTES NFR BLD AUTO: 29 % (ref 14–44)
MAGNESIUM SERPL-MCNC: 2 MG/DL (ref 1.9–2.7)
MCH RBC QN AUTO: 33.4 PG (ref 26.8–34.3)
MCHC RBC AUTO-ENTMCNC: 33.1 G/DL (ref 31.4–37.4)
MCV RBC AUTO: 101 FL (ref 82–98)
MONOCYTES # BLD AUTO: 0.91 THOUSAND/ÂΜL (ref 0.17–1.22)
MONOCYTES NFR BLD AUTO: 11 % (ref 4–12)
NEUTROPHILS # BLD AUTO: 4.44 THOUSANDS/ÂΜL (ref 1.85–7.62)
NEUTS SEG NFR BLD AUTO: 54 % (ref 43–75)
NRBC BLD AUTO-RTO: 0 /100 WBCS
PHOSPHATE SERPL-MCNC: 3.4 MG/DL (ref 2.3–4.1)
PLATELET # BLD AUTO: 195 THOUSANDS/UL (ref 149–390)
PMV BLD AUTO: 10.6 FL (ref 8.9–12.7)
POTASSIUM SERPL-SCNC: 4.1 MMOL/L (ref 3.5–5.3)
PROT SERPL-MCNC: 6.2 G/DL (ref 6.4–8.4)
PROTHROMBIN TIME: 13.9 SECONDS (ref 12.3–15)
RBC # BLD AUTO: 4.13 MILLION/UL (ref 3.88–5.62)
RSV RNA RESP QL NAA+PROBE: NEGATIVE
SARS-COV-2 RNA RESP QL NAA+PROBE: NEGATIVE
SODIUM SERPL-SCNC: 140 MMOL/L (ref 135–147)
WBC # BLD AUTO: 8.24 THOUSAND/UL (ref 4.31–10.16)

## 2024-09-06 PROCEDURE — 99285 EMERGENCY DEPT VISIT HI MDM: CPT | Performed by: EMERGENCY MEDICINE

## 2024-09-06 PROCEDURE — 71045 X-RAY EXAM CHEST 1 VIEW: CPT

## 2024-09-06 PROCEDURE — 84100 ASSAY OF PHOSPHORUS: CPT | Performed by: EMERGENCY MEDICINE

## 2024-09-06 PROCEDURE — 0241U HB NFCT DS VIR RESP RNA 4 TRGT: CPT | Performed by: EMERGENCY MEDICINE

## 2024-09-06 PROCEDURE — 83735 ASSAY OF MAGNESIUM: CPT | Performed by: EMERGENCY MEDICINE

## 2024-09-06 PROCEDURE — 85730 THROMBOPLASTIN TIME PARTIAL: CPT | Performed by: EMERGENCY MEDICINE

## 2024-09-06 PROCEDURE — 83880 ASSAY OF NATRIURETIC PEPTIDE: CPT | Performed by: EMERGENCY MEDICINE

## 2024-09-06 PROCEDURE — 84484 ASSAY OF TROPONIN QUANT: CPT | Performed by: EMERGENCY MEDICINE

## 2024-09-06 PROCEDURE — 85610 PROTHROMBIN TIME: CPT | Performed by: EMERGENCY MEDICINE

## 2024-09-06 PROCEDURE — S9083 URGENT CARE CENTER GLOBAL: HCPCS | Performed by: NURSE PRACTITIONER

## 2024-09-06 PROCEDURE — 80053 COMPREHEN METABOLIC PANEL: CPT | Performed by: EMERGENCY MEDICINE

## 2024-09-06 PROCEDURE — 36415 COLL VENOUS BLD VENIPUNCTURE: CPT | Performed by: EMERGENCY MEDICINE

## 2024-09-06 PROCEDURE — 93005 ELECTROCARDIOGRAM TRACING: CPT

## 2024-09-06 PROCEDURE — 85025 COMPLETE CBC W/AUTO DIFF WBC: CPT | Performed by: EMERGENCY MEDICINE

## 2024-09-06 PROCEDURE — 99285 EMERGENCY DEPT VISIT HI MDM: CPT

## 2024-09-06 PROCEDURE — 99213 OFFICE O/P EST LOW 20 MIN: CPT | Performed by: NURSE PRACTITIONER

## 2024-09-07 NOTE — PROGRESS NOTES
[facility]        NAME: Camilo Chaidez is a 89 y.o. male  : 1934    MRN: [id number]  DATE: 2024  TIME: 8:12 AM    Assessment and Plan   SOB (shortness of breath) [R06.02]  1. SOB (shortness of breath)  Transfer to other facility      2. Primary hypertension  Transfer to other facility        Advised patient that due to unstable blood pressures accompanied by sob and fatigue it is advised to proceed to ED for further work up and eval. Patient agreeable and wife is transporting patient. VSS in office.     Patient Instructions       Follow up with PCP in 3-5 days.  Proceed to  ER if symptoms worsen.    If tests are performed, our office will contact you with results only if changes need to made to the care plan discussed with you at the visit. [facility]    Chief Complaint     Chief Complaint   Patient presents with    Hypertension     Blood pressure up and down for the week, worse today. Feels tired    Shortness of Breath     Short of breath since this afternoon         History of Present Illness       Patient states he checks bp at home about 3 times daily. Recently there has been significant fluctuations and today was feeling fatigued and sob. States a pressure of 70's/30's and wife was concerned.     Hypertension  This is a chronic problem. The current episode started in the past 7 days (bp fluctutations). The problem has been waxing and waning since onset. The problem is uncontrolled. Associated symptoms include malaise/fatigue, peripheral edema and shortness of breath. Pertinent negatives include no anxiety, blurred vision, chest pain, headaches, neck pain, orthopnea, palpitations, PND or sweats. Risk factors for coronary artery disease include male gender. Past treatments include ACE inhibitors, diuretics and angiotensin blockers. The current treatment provides mild improvement.   Shortness of Breath  Associated symptoms include  fatigue and leg swelling. Pertinent negatives include no chest pain, coughing, dizziness, orthopnea, palpitations, sore throat or sweats.       Review of Systems   Review of Systems   Constitutional:  Positive for fatigue and malaise/fatigue. Negative for chills, diaphoresis and fever.   HENT:  Negative for congestion, ear pain and sore throat.    Eyes:  Negative for blurred vision.   Respiratory:  Positive for shortness of breath. Negative for cough and chest tightness.    Cardiovascular:  Positive for leg swelling. Negative for chest pain, palpitations, orthopnea and PND.   Musculoskeletal:  Negative for neck pain.   Neurological:  Negative for dizziness, syncope, light-headedness and headaches.         Current Medications       Current Outpatient Medications:     allopurinol (ZYLOPRIM) 300 mg tablet, Take 300 mg by mouth daily, Disp: , Rfl:     amLODIPine (NORVASC) 5 mg tablet, Take 5 mg by mouth daily, Disp: , Rfl:     aspirin 81 mg chewable tablet, Chew 1 tablet (81 mg total) daily, Disp: , Rfl:     benazepril (LOTENSIN) 10 mg tablet, Take 10 mg by mouth, Disp: , Rfl:     furosemide (LASIX) 40 mg tablet, Take 1 tablet (40 mg total) by mouth as needed (Only take when the morning weight is greater than 245 lb.), Disp: , Rfl:     isosorbide mononitrate (IMDUR) 60 mg 24 hr tablet, Take 90 mg by mouth daily, Disp: , Rfl:     latanoprost (XALATAN) 0.005 % ophthalmic solution, Apply 1 drop to eye, Disp: , Rfl:     multivitamin (THERAGRAN) TABS, Take 1 tablet by mouth daily, Disp: , Rfl:     omeprazole (PriLOSEC OTC) 20 MG tablet, Take 20 mg by mouth daily, Disp: , Rfl:     potassium chloride (K-DUR,KLOR-CON) 10 mEq tablet, Take 1 tablet (10 mEq total) by mouth as needed (Morning weight more than 245 lb.), Disp: 90 tablet, Rfl: 5    rosuvastatin (CRESTOR) 20 MG tablet, Take 20 mg by mouth daily, Disp: , Rfl:     Current Allergies     Allergies as of 09/06/2024 - Reviewed 09/06/2024   Allergen Reaction Noted     "Metoprolol Shortness Of Breath 11/14/2018            The following portions of the patient's history were reviewed and updated as appropriate: allergies, current medications, past family history, past medical history, past social history, past surgical history and problem list.     Past Medical History:   Diagnosis Date    CAD (coronary artery disease)     Free radial graft to D1/D2/OM2, LIMA to LAD SVG - OM3 (1st CABG 1982 and redo 1989); SVG-OM3 9/2001 stents    CHB (complete heart block) ([facility]) 03/29/2021    dual chamber Medtonic pacemaker    Hyperlipidemia     Hypertension        Past Surgical History:   Procedure Laterality Date    CARDIAC CATHETERIZATION  11/15/2018    medical therapy; cath [facility] care [facility] from [facility]    CORONARY ANGIOPLASTY WITH STENT PLACEMENT  09/2001    SVG-OM3 stents    CORONARY ARTERY BYPASS GRAFT  1982    Free radial graft to D1/D2/OM2, LIMA to LAD SVG - OM3    CORONARY ARTERY BYPASS GRAFT  1989    Re-do CABG       History reviewed. No pertinent family history.      Medications have been verified.        Objective   /60   Pulse 76   Temp 97.8 °F (36.6 °C) (Temporal)   Resp 22   Ht 5' 9\" (1.753 m)   Wt 102 kg (225 lb)   SpO2 99%   BMI 33.23 kg/m²        Physical Exam     Physical Exam  Vitals and nursing note reviewed.   Constitutional:       General: He is not in acute distress.     Appearance: Normal appearance. He is not ill-appearing.   HENT:      Head: Normocephalic and atraumatic.   Eyes:      Pupils: Pupils are equal, round, and reactive to light.   Cardiovascular:      Rate and Rhythm: Normal rate and regular rhythm.      Heart sounds: Normal heart sounds, S1 normal and S2 normal.   Pulmonary:      Effort: Pulmonary effort is normal. No accessory muscle usage.      Breath sounds: Normal breath sounds. No wheezing.      Comments: Mild increased effort.   Skin:     General: Skin is warm and dry.      Capillary Refill: Capillary refill takes less than 2 seconds.      " Findings: No rash.   Neurological:      General: No focal deficit present.      Mental Status: He is alert and oriented to person, place, and time.      Motor: Motor function is intact.   Psychiatric:         Attention and Perception: Attention and perception normal.         Mood and Affect: Mood and affect normal.         Speech: Speech normal.         Behavior: Behavior normal. Behavior is cooperative.         Thought Content: Thought content normal.

## 2024-09-07 NOTE — DISCHARGE INSTRUCTIONS
Return to the ER for any new, concerning, worsening issues.  I would recommend you talking to your family doctor to see if medication adjustment is necessary.  Your blood pressure may be decreasing at certain points of the day due to his medication dosages.  Try contacting them tomorrow morning to see if they have any early morning hours.    This may be amlodipine, Benzapril, Imdur or Lasix.

## 2024-09-07 NOTE — ED PROVIDER NOTES
History  Chief Complaint   Patient presents with    Shortness of Breath     Patient presents with shortness of breath that started today. Reports heart rate has been in the 70s-30s which is unusual for him.      89-year-old male with history of coronary artery disease presents to the emergency department complaining of blood pressure that has been intermittently low for roughly a month, as well as exertional fatigue without chest pain or fever.  Patient denies cough or congestion.  Patient notes that he has had coronary artery disease with 2 bypass surgeries.  Patient also has a pacemaker and a stent.  Patient notes that he went to the urgent care today and was promptly sent to the ER for further evaluation.  Patient notes he feels okay at present but felt winded and fatigued after going to a store.        Prior to Admission Medications   Prescriptions Last Dose Informant Patient Reported? Taking?   allopurinol (ZYLOPRIM) 300 mg tablet   Yes No   Sig: Take 300 mg by mouth daily   amLODIPine (NORVASC) 5 mg tablet  Self Yes No   Sig: Take 5 mg by mouth daily   aspirin 81 mg chewable tablet   No No   Sig: Chew 1 tablet (81 mg total) daily   benazepril (LOTENSIN) 10 mg tablet   Yes No   Sig: Take 10 mg by mouth   furosemide (LASIX) 40 mg tablet   No No   Sig: Take 1 tablet (40 mg total) by mouth as needed (Only take when the morning weight is greater than 245 lb.)   isosorbide mononitrate (IMDUR) 60 mg 24 hr tablet   Yes No   Sig: Take 90 mg by mouth daily   latanoprost (XALATAN) 0.005 % ophthalmic solution   Yes No   Sig: Apply 1 drop to eye   multivitamin (THERAGRAN) TABS   Yes No   Sig: Take 1 tablet by mouth daily   omeprazole (PriLOSEC OTC) 20 MG tablet  Self Yes No   Sig: Take 20 mg by mouth daily   potassium chloride (K-DUR,KLOR-CON) 10 mEq tablet   No No   Sig: Take 1 tablet (10 mEq total) by mouth as needed (Morning weight more than 245 lb.)   rosuvastatin (CRESTOR) 20 MG tablet   Yes No   Sig: Take 20 mg by  mouth daily      Facility-Administered Medications: None       Past Medical History:   Diagnosis Date    CAD (coronary artery disease)     Free radial graft to D1/D2/OM2, LIMA to LAD SVG - OM3 (1st CABG 1982 and redo 1989); SVG-OM3 9/2001 stents    CHB (complete heart block) (HCC) 03/29/2021    dual chamber Medtonic pacemaker    Hyperlipidemia     Hypertension        Past Surgical History:   Procedure Laterality Date    CARDIAC CATHETERIZATION  11/15/2018    medical therapy; cath in care everywhere from [facility]    CORONARY ANGIOPLASTY WITH STENT PLACEMENT  09/2001    SVG-OM3 stents    CORONARY ARTERY BYPASS GRAFT  1982    Free radial graft to D1/D2/OM2, LIMA to LAD SVG - OM3    CORONARY ARTERY BYPASS GRAFT  1989    Re-do CABG       History reviewed. No pertinent family history.  I have reviewed and agree with the history as documented.    E-Cigarette/Vaping    E-Cigarette Use Never User      E-Cigarette/Vaping Substances    Nicotine No     THC No     CBD No     Flavoring No     Other No     Unknown No      Social History     Tobacco Use    Smoking status: Never    Smokeless tobacco: Never   Vaping Use    Vaping status: Never Used   Substance Use Topics    Alcohol use: Never    Drug use: Never       Review of Systems   Constitutional:  Positive for activity change and fatigue. Negative for chills and fever.   HENT:  Negative for congestion, ear pain and sore throat.    Eyes:  Negative for pain and visual disturbance.   Respiratory:  Positive for shortness of breath. Negative for cough.    Cardiovascular:  Negative for chest pain and palpitations.   Gastrointestinal:  Negative for abdominal pain and vomiting.   Genitourinary:  Negative for dysuria and hematuria.   Musculoskeletal:  Negative for arthralgias and back pain.   Skin:  Negative for color change and rash.   Neurological:  Negative for seizures, syncope and weakness.   All other systems reviewed and are negative.      Physical Exam  Physical Exam  Constitutional:        General: He is not in acute distress.     Appearance: Normal appearance. He is normal weight. He is not ill-appearing.   HENT:      Head: Normocephalic and atraumatic.      Right Ear: External ear normal.      Left Ear: External ear normal.      Nose: Nose normal.      Mouth/Throat:      Mouth: Mucous membranes are moist.   Eyes:      Conjunctiva/sclera: Conjunctivae normal.   Cardiovascular:      Rate and Rhythm: Normal rate and regular rhythm.      Pulses: Normal pulses.      Heart sounds: Normal heart sounds.   Pulmonary:      Effort: Pulmonary effort is normal.      Breath sounds: Examination of the right-middle field reveals decreased breath sounds. Examination of the left-middle field reveals decreased breath sounds. Examination of the right-lower field reveals decreased breath sounds. Examination of the left-lower field reveals decreased breath sounds. Decreased breath sounds present. No wheezing, rhonchi or rales.   Abdominal:      General: Abdomen is flat. There is no distension.      Palpations: Abdomen is soft. There is no mass.   Musculoskeletal:         General: No swelling, tenderness or deformity. Normal range of motion.      Cervical back: Normal range of motion.      Right lower leg: Edema present.      Left lower leg: Edema present.   Skin:     General: Skin is warm and dry.      Capillary Refill: Capillary refill takes 2 to 3 seconds.      Coloration: Skin is pale.   Neurological:      General: No focal deficit present.      Mental Status: He is alert and oriented to person, place, and time. Mental status is at baseline.   Psychiatric:         Mood and Affect: Mood normal. Mood is not anxious.         Vital Signs  ED Triage Vitals [09/06/24 2046]   Temperature Pulse Respirations Blood Pressure SpO2   97.8 °F (36.6 °C) 86 20 126/58 95 %      Temp src Heart Rate Source Patient Position - Orthostatic VS BP Location FiO2 (%)   -- -- -- -- --      Pain Score       6           Vitals:    09/06/24  2100 09/06/24 2144 09/06/24 2200 09/06/24 2230   BP: 104/53 119/58 107/53 118/58   Pulse: 60 60 60 60         Visual Acuity      ED Medications  Medications - No data to display    Diagnostic Studies  Results Reviewed       Procedure Component Value Units Date/Time    Protime-INR [[id number]]  (Normal) Collected: 09/06/24 2241    Lab Status: Final result Specimen: Blood from Arm, Right Updated: 09/06/24 2307     Protime 13.9 seconds      INR 1.02    Narrative:      INR Therapeutic Range    Indication                                             INR Range      Atrial Fibrillation                                               2.0-3.0  Hypercoagulable State                                    2.0.2.3  Left Ventricular Asist Device                            2.0-3.0  Mechanical Heart Valve                                  -    Aortic(with afib, MI, embolism, HF, LA enlargement,    and/or coagulopathy)                                     2.0-3.0 (2.5-3.5)     Mitral                                                             2.5-3.5  Prosthetic/Bioprosthetic Heart Valve               2.0-3.0  Venous thromboembolism (VTE: VT, PE        2.0-3.0    APTT [[id number]]  (Normal) Collected: 09/06/24 2241    Lab Status: Final result Specimen: Blood from Arm, Right Updated: 09/06/24 2307     PTT 29 seconds     FLU/RSV/COVID - if FLU/RSV clinically relevant [[id number]]  (Normal) Collected: 09/06/24 2131    Lab Status: Final result Specimen: Nares from Nose Updated: 09/06/24 2222     SARS-CoV-2 Negative     INFLUENZA A PCR Negative     INFLUENZA B PCR Negative     RSV PCR Negative    Narrative:      This test has been performed using the CoV-2/Flu/RSV plus assay on the [employer] GeneXpert platform. This test has been validated by the  and verified by the performing laboratory.     This test is designed to amplify and detect the following: nucleocapsid (N), envelope (E), and RNA-dependent RNA polymerase (RdRP) genes of the  SARS-CoV-2 genome; matrix (M), basic polymerase (PB2), and acidic protein (PA) segments of the influenza A genome; matrix (M) and non-structural protein (NS) segments of the influenza B genome, and the nucleocapsid genes of RSV A and RSV B.     Positive results are indicative of the presence of Flu A, Flu B, RSV, and/or SARS-CoV-2 RNA. Positive results for SARS-CoV-2 or suspected novel influenza should be reported to state, local, or federal health departments according to local reporting requirements.      All results should be assessed in conjunction with clinical presentation and other laboratory markers for clinical management.     FOR PEDIATRIC PATIENTS - copy/paste COVID Guidelines URL to browser: [email or web address]       HS Troponin 0hr (reflex protocol) [[id number]]  (Normal) Collected: 09/06/24 2131    Lab Status: Final result Specimen: Blood from Arm, Left Updated: 09/06/24 2200     hs TnI 0hr 4 ng/L     Comprehensive metabolic panel [[id number]]  (Abnormal) Collected: 09/06/24 2131    Lab Status: Final result Specimen: Blood from Arm, Left Updated: 09/06/24 2159     Sodium 140 mmol/L      Potassium 4.1 mmol/L      Chloride 104 mmol/L      CO2 26 mmol/L      ANION GAP 10 mmol/L      BUN 21 mg/dL      Creatinine 1.13 mg/dL      Glucose 125 mg/dL      Calcium 9.0 mg/dL      AST 33 U/L      ALT 22 U/L      Alkaline Phosphatase 43 U/L      Total Protein 6.2 g/dL      Albumin 3.5 g/dL      Total Bilirubin 0.49 mg/dL      eGFR 57 ml/min/1.73sq m     Narrative:      Slightly Hemolyzed:Results may be affected.  National Kidney Disease Foundation guidelines for Chronic Kidney Disease (CKD):     Stage 1 with normal or high GFR (GFR > 90 mL/min/1.73 square meters)    Stage 2 Mild CKD (GFR = 60-89 mL/min/1.73 square meters)    Stage 3A Moderate CKD (GFR = 45-59 mL/min/1.73 square meters)    Stage 3B Moderate CKD (GFR = 30-44 mL/min/1.73 square meters)    Stage 4  Severe CKD (GFR = 15-29 mL/min/1.73 square meters)    Stage 5 End Stage CKD (GFR <15 mL/min/1.73 square meters)  Note: GFR calculation is accurate only with a steady state creatinine    Phosphorus [[id number]]  (Normal) Collected: 09/06/24 2131    Lab Status: Final result Specimen: Blood from Arm, Left Updated: 09/06/24 2159     Phosphorus 3.4 mg/dL     Narrative:      Slightly Hemolyzed:Results may be affected.    Magnesium [[id number]]  (Normal) Collected: 09/06/24 2131    Lab Status: Final result Specimen: Blood from Arm, Left Updated: 09/06/24 2159     Magnesium 2.0 mg/dL     Narrative:      Slightly Hemolyzed:Results may be affected.    B-Type Natriuretic Peptide(BNP) [[id number]]  (Abnormal) Collected: 09/06/24 2131    Lab Status: Final result Specimen: Blood from Arm, Left Updated: 09/06/24 2158      pg/mL     CBC and differential [[id number]]  (Abnormal) Collected: 09/06/24 2131    Lab Status: Final result Specimen: Blood from Arm, Left Updated: 09/06/24 2138     WBC 8.24 Thousand/uL      RBC 4.13 Million/uL      Hemoglobin 13.8 g/dL      Hematocrit 41.7 %       fL      MCH 33.4 pg      MCHC 33.1 g/dL      RDW 13.2 %      MPV 10.6 fL      Platelets 195 Thousands/uL      nRBC 0 /100 WBCs      Segmented % 54 %      Immature Grans % 0 %      Lymphocytes % 29 %      Monocytes % 11 %      Eosinophils Relative 5 %      Basophils Relative 1 %      Absolute Neutrophils 4.44 Thousands/µL      Absolute Immature Grans 0.02 Thousand/uL      Absolute Lymphocytes 2.37 Thousands/µL      Absolute Monocytes 0.91 Thousand/µL      Eosinophils Absolute 0.44 Thousand/µL      Basophils Absolute 0.06 Thousands/µL                    XR chest 1 view portable   ED Interpretation by Chau Musa Jr., DO (09/06 2156)   No definitive acute pulmonary pathology.                 Procedures  ECG 12 Lead Documentation Only    Date/Time: 9/6/2024 9:26 PM    Performed by: Chau Musa Jr., DO  Authorized by: Chau THORPE  Lencho Rodriguez, DO    ECG reviewed by me, the ED Provider: yes    Patient location:  ED  Comments:      EKG shows a dual-chamber paced rhythm at 74 beats a minute.  There is a normal axis.  There is LVH by voltage criteria.  There is no other definitive acute ST or T wave changes.  There is Q waves in the anterior leads which may relate a possible old anterior wall MI.           ED Course                                 SBIRT 22yo+      Flowsheet Row Most Recent Value   Initial Alcohol Screen: US AUDIT-C     1. How often do you have a drink containing alcohol? 0 Filed at: 09/06/2024 2048   2. How many drinks containing alcohol do you have on a typical day you are drinking?  0 Filed at: 09/06/2024 2048   3a. Male UNDER 65: How often do you have five or more drinks on one occasion? 0 Filed at: 09/06/2024 2048   3b. FEMALE Any Age, or MALE 65+: How often do you have 4 or more drinks on one occassion? 0 Filed at: 09/06/2024 2048   Audit-C Score 0 Filed at: 09/06/2024 2048   JOSEPH: How many times in the past year have you...    Used an illegal drug or used a prescription medication for non-medical reasons? Never Filed at: 09/06/2024 2048                      Medical Decision Making  89-year-old male with a history of coronary artery disease presents the emergency department secondary to feeling fatigued and short of breath with exertion.  The patient notes that he feels okay at this moment but states that if he does physical activity he is tends to get more winded and when he took his blood pressure this afternoon he found it to be low namely into the 80s systolic earlier today.  The patient was not symptomatic at the time but notes that he takes his blood pressure 3 times a day because his wife does.  The patient notes that there has been some ongoing findings of low blood pressure at times.  The patient arrives emergency department with a blood pressure between 100 - 120 systolic while being evaluated.  Patient notes he is  on blood pressure medicines and has a history of CABG/CAD.  The patient has no chest pain fever or abdominal discomfort.  Differential diagnosis is cardiomyopathy possibly from coronary artery disease, infection, dehydration, electrolyte dysfunction, acute coronary syndrome, or CHF.  Chest x-ray appears to be nonacute and BMP is also reassuring at this time.  Troponin is less than 5.  The patient also has no chest pain.  Chest x-ray reveals no significant pulmonary vascular congestion and O2 sat in the emergency department is between 92 and 95% on room air.  Patient's blood pressure is also within normal limits.  It is possible that there could be some discrepancy because of a home cough or possibly a need to have his medication adjusted as maybe he is having a spike in medication efficacy during a certain time of the day which is making him symptomatic.  I feel he should discuss this with his family doctor.  Patient is not having any signs of an acute coronary event or evidence of acute congestive heart failure organ dysfunction or electrolyte abnormality.  The patient will be discharged with instructions to contact his doctor over the weekend or Monday to have further evaluation of his medication and dosages.  Patient discharged.    Amount and/or Complexity of Data Reviewed  Labs: ordered.  Radiology: ordered and independent interpretation performed.                 Disposition  Final diagnoses:   Fatigue     Time reflects when diagnosis was documented in both MDM as applicable and the Disposition within this note       Time User Action Codes Description Comment    9/6/2024 11:02 PM Chau Musa Add [R53.83] Fatigue           ED Disposition       ED Disposition   Discharge    Condition   Stable    Date/Time   Fri Sep 6, 2024 3184    Comment   Camilo Chaidez discharge to home/self care.                   Follow-up Information       Follow up With Specialties Details Why Contact Info    Alexis Fields DO  Internal Medicine On 9/10/2024  [address] [address]  [phone]              Discharge Medication List as of 9/6/2024 11:02 PM        CONTINUE these medications which have NOT CHANGED    Details   allopurinol (ZYLOPRIM) 300 mg tablet Take 300 mg by mouth daily, Historical Med      amLODIPine (NORVASC) 5 mg tablet Take 5 mg by mouth daily, Historical Med      aspirin 81 mg chewable tablet Chew 1 tablet (81 mg total) daily, Starting Mon 4/26/2021, No Print      benazepril (LOTENSIN) 10 mg tablet Take 10 mg by mouth, Historical Med      furosemide (LASIX) 40 mg tablet Take 1 tablet (40 mg total) by mouth as needed (Only take when the morning weight is greater than 245 lb.), Starting Mon 4/26/2021, No Print      isosorbide mononitrate (IMDUR) 60 mg 24 hr tablet Take 90 mg by mouth daily, Historical Med      latanoprost (XALATAN) 0.005 % ophthalmic solution Apply 1 drop to eye, Historical Med      multivitamin (THERAGRAN) TABS Take 1 tablet by mouth daily, Historical Med      omeprazole (PriLOSEC OTC) 20 MG tablet Take 20 mg by mouth daily, Historical Med      potassium chloride (K-DUR,KLOR-CON) 10 mEq tablet Take 1 tablet (10 mEq total) by mouth as needed (Morning weight more than 245 lb.), Starting Mon 4/26/2021, No Print      rosuvastatin (CRESTOR) 20 MG tablet Take 20 mg by mouth daily, Historical Med             No discharge procedures on file.    PDMP Review       None            ED Provider  Electronically Signed by             Chau Musa Jr., DO  09/07/24 0649

## 2024-09-10 LAB
ATRIAL RATE: 576 BPM
QRS AXIS: -33 DEGREES
QRSD INTERVAL: 162 MS
QT INTERVAL: 450 MS
QTC INTERVAL: 499 MS
T WAVE AXIS: 263 DEGREES
VENTRICULAR RATE: 74 BPM

## 2024-09-10 PROCEDURE — 93010 ELECTROCARDIOGRAM REPORT: CPT | Performed by: INTERNAL MEDICINE

## 2025-07-23 ENCOUNTER — TELEPHONE (OUTPATIENT)
Age: OVER 89
End: 2025-07-23

## 2025-07-23 NOTE — TELEPHONE ENCOUNTER
Patients spouse called the refill line for a refill of the Allopurinol and the Amlodipine. Informed her that the medications were sent to the patients pharmacy.

## 2025-08-05 ENCOUNTER — REMOTE DEVICE CLINIC VISIT (OUTPATIENT)
Dept: CARDIOLOGY CLINIC | Facility: CLINIC | Age: OVER 89
End: 2025-08-05
Payer: COMMERCIAL

## 2025-08-05 DIAGNOSIS — I44.2 CHB (COMPLETE HEART BLOCK) (HCC): Primary | ICD-10-CM

## 2025-08-05 PROCEDURE — 93294 REM INTERROG EVL PM/LDLS PM: CPT | Performed by: INTERNAL MEDICINE

## 2025-08-05 PROCEDURE — 93296 REM INTERROG EVL PM/IDS: CPT | Performed by: INTERNAL MEDICINE
